# Patient Record
Sex: FEMALE | Race: BLACK OR AFRICAN AMERICAN | Employment: FULL TIME | URBAN - METROPOLITAN AREA
[De-identification: names, ages, dates, MRNs, and addresses within clinical notes are randomized per-mention and may not be internally consistent; named-entity substitution may affect disease eponyms.]

---

## 2017-03-22 ENCOUNTER — OFFICE VISIT (OUTPATIENT)
Dept: FAMILY MEDICINE CLINIC | Age: 32
End: 2017-03-22

## 2017-03-22 VITALS
OXYGEN SATURATION: 99 % | DIASTOLIC BLOOD PRESSURE: 80 MMHG | WEIGHT: 205 LBS | RESPIRATION RATE: 16 BRPM | TEMPERATURE: 98.6 F | HEART RATE: 80 BPM | HEIGHT: 64 IN | SYSTOLIC BLOOD PRESSURE: 106 MMHG | BODY MASS INDEX: 35 KG/M2

## 2017-03-22 DIAGNOSIS — R68.89 FLU-LIKE SYMPTOMS: ICD-10-CM

## 2017-03-22 DIAGNOSIS — H10.021 OTHER MUCOPURULENT CONJUNCTIVITIS OF RIGHT EYE: Primary | ICD-10-CM

## 2017-03-22 LAB
FLUAV+FLUBV AG NOSE QL IA.RAPID: NEGATIVE POS/NEG
FLUAV+FLUBV AG NOSE QL IA.RAPID: NEGATIVE POS/NEG
VALID INTERNAL CONTROL?: YES

## 2017-03-22 RX ORDER — TOBRAMYCIN 3 MG/ML
1 SOLUTION/ DROPS OPHTHALMIC EVERY 6 HOURS
Qty: 1 BOTTLE | Refills: 0 | Status: SHIPPED | OUTPATIENT
Start: 2017-03-22 | End: 2017-04-01

## 2017-03-22 NOTE — PROGRESS NOTES
Marisa Diaz is a 36 y.o. female here for eye problem      1. Have you been to the ER, urgent care clinic or hospitalized since your last visit? NO.     2. Have you seen or consulted any other health care providers outside of the [address] [place] [address] since your last visit (Include any pap smears or colon screening)? NO      Do you have an Advanced Directive? NO    Would you like information on Advanced Directives?  NO

## 2017-03-22 NOTE — PATIENT INSTRUCTIONS
Tobrex drops, 1 drop to right eye 4 x daily for 7-10 days  Apply warm wet compresses frequently. Complete prescribed course of antibiotics. See patient instructions. Follow up for new symptoms, worsening symptoms or failure to improve. Increase fluids, rest, OTC ibuprofen and or Tylenol as needed for pain and fever       Pinkeye: Care Instructions  Your Care Instructions    Pinkeye is redness and swelling of the eye surface and the conjunctiva (the lining of the eyelid and the covering of the white part of the eye). Pinkeye is also called conjunctivitis. Pinkeye is often caused by infection with bacteria or a virus. Dry air, allergies, smoke, and chemicals are other common causes. Pinkeye often clears on its own in 7 to 10 days. Antibiotics only help if the pinkeye is caused by bacteria. Pinkeye caused by infection spreads easily. If an allergy or chemical is causing pinkeye, it will not go away unless you can avoid whatever is causing it. Follow-up care is a key part of your treatment and safety. Be sure to make and go to all appointments, and call your doctor if you are having problems. Its also a good idea to know your test results and keep a list of the medicines you take. How can you care for yourself at home? · Wash your hands often. Always wash them before and after you treat pinkeye or touch your eyes or face. · Use moist cotton or a clean, wet cloth to remove crust. Wipe from the inside corner of the eye to the outside. Use a clean part of the cloth for each wipe. · Put cold or warm wet cloths on your eye a few times a day if the eye hurts. · Do not wear contact lenses or eye makeup until the pinkeye is gone. Throw away any eye makeup you were using when you got pinkeye. Clean your contacts and storage case. If you wear disposable contacts, use a new pair when your eye has cleared and it is safe to wear contacts again.   · If the doctor gave you antibiotic ointment or eyedrops, use them as directed. Use the medicine for as long as instructed, even if your eye starts looking better soon. Keep the bottle tip clean, and do not let it touch the eye area. · To put in eyedrops or ointment:  ¨ Tilt your head back, and pull your lower eyelid down with one finger. ¨ Drop or squirt the medicine inside the lower lid. ¨ Close your eye for 30 to 60 seconds to let the drops or ointment move around. ¨ Do not touch the ointment or dropper tip to your eyelashes or any other surface. · Do not share towels, pillows, or washcloths while you have pinkeye. When should you call for help? Call your doctor now or seek immediate medical care if:  · You have pain in your eye, not just irritation on the surface. · You have a change in vision or loss of vision. · You have an increase in discharge from the eye. · Your eye has not started to improve or begins to get worse within 48 hours after you start using antibiotics. · Pinkeye lasts longer than 7 days. Watch closely for changes in your health, and be sure to contact your doctor if you have any problems. Where can you learn more? Go to http://placido-willi.info/. Enter Y392 in the search box to learn more about \"Pinkeye: Care Instructions. \"  Current as of: May 27, 2016  Content Version: 11.1  © 9691-8878 [employer]. Care instructions adapted under license by [employer] (which disclaims liability or warranty for this information). If you have questions about a medical condition or this instruction, always ask your healthcare professional. [place] [address] any warranty or liability for your use of this information.

## 2017-03-22 NOTE — PROGRESS NOTES
HISTORY OF PRESENT ILLNESS  Otoniel Sosa is a 36 y.o. female. Eye Problem   The history is provided by the patient and medical records. This is a new problem. Episode onset: 1 week ago. Associated symptoms include headaches. Patient Active Problem List   Diagnosis Code    Asthma J45.909    PUD (peptic ulcer disease) K27.9    Chronic abdominal pain R10.9, G89.29    Chronic constipation K59.09    Chronic tension-type headache, not intractable G44.229       Current Outpatient Prescriptions:     albuterol (PROVENTIL HFA, VENTOLIN HFA, PROAIR HFA) 90 mcg/actuation inhaler, Take 2 Puffs by inhalation every four (4) hours as needed for Wheezing., Disp: 1 Inhaler, Rfl: 11    amitriptyline (ELAVIL) 25 mg tablet, Take 1 Tab by mouth nightly., Disp: 30 Tab, Rfl: 1    inhalational spacing device, 1 Each by Does Not Apply route as needed. , Disp: 1 Each, Rfl: 0    esomeprazole (NEXIUM) 40 mg capsule, Take 1 Cap by mouth daily. , Disp: 30 Cap, Rfl: 3    EPINEPHrine (EPIPEN) 0.3 mg/0.3 mL injection, 0.3 mg by IntraMUSCular route once as needed. , Disp: , Rfl:     mometasone-formoterol (DULERA) 200-5 mcg/actuation HFA inhaler, Take 2 Puffs by inhalation two (2) times a day., Disp: , Rfl:     albuterol (PROVENTIL VENTOLIN) 2.5 mg /3 mL (0.083 %) nebulizer solution, by Nebulization route as needed. , Disp: , Rfl:       Review of Systems   Constitutional: Positive for chills and fever (101.7). HENT: Positive for congestion. Negative for sore throat. Respiratory: Positive for cough. Negative for sputum production. Gastrointestinal: Negative for diarrhea and vomiting. Musculoskeletal: Positive for myalgias. Neurological: Positive for headaches.      Visit Vitals    /80 (BP 1 Location: Left arm, BP Patient Position: Sitting)    Pulse 80    Temp 98.6 °F (37 °C) (Oral)    Resp 16    Ht 5' 4\" (1.626 m)    Wt 205 lb (93 kg)    SpO2 99%    BMI 35.19 kg/m2       Physical Exam   Constitutional: She is oriented to person, place, and time. She appears well-developed and well-nourished. HENT:   Head: Normocephalic. Right Ear: Tympanic membrane and ear canal normal.   Left Ear: Tympanic membrane and ear canal normal.   Mouth/Throat: Oropharynx is clear and moist.   Eyes: EOM are normal.   Conjunctival erythema OD, anterior chamber clear   Neck: Neck supple. Cardiovascular: Normal rate, regular rhythm and normal heart sounds. Pulmonary/Chest: Effort normal and breath sounds normal.   Lymphadenopathy:     She has no cervical adenopathy. Neurological: She is alert and oriented to person, place, and time. Skin: Skin is warm and dry. Psychiatric: She has a normal mood and affect. Her behavior is normal.   Nursing note and vitals reviewed. Issa rapid flu negative for influenza type A or type B  ASSESSMENT and PLAN    ICD-10-CM ICD-9-CM    1. Other mucopurulent conjunctivitis of right eye H10.021     2. Flu-like symptoms R68.89 780.99 AMB POC ISSA INFLUENZA A/B TEST   Tobrex drops, 1 drop to right eye 4 x daily for 7-10 days  Apply warm wet compresses frequently. Complete prescribed course of antibiotics. See patient instructions. Follow up for new symptoms, worsening symptoms or failure to improve.   Increase fluids, rest, OTC ibuprofen and or Tylenol as needed for pain and fever

## 2017-03-22 NOTE — LETTER
NOTIFICATION RETURN TO WORK / SCHOOL 
 
3/22/2017 2:10 PM 
 
Ms. Elvi Wilkes [address] [address] To Whom It May Concern: 
 
Tomy Sosa is currently under the care of [address] [place] [address]. She will return to work/school on: 3/27/2017 If there are questions or concerns please have the patient contact our office. Sincerely, Pedro Rush MD

## 2017-03-22 NOTE — MR AVS SNAPSHOT
Visit Information Date & Time Provider Department Dept. Phone Encounter #  
 3/22/2017  1:45 PM Rubén Childress, Erik [facility] [phone] [id number] Upcoming Health Maintenance Date Due Pneumococcal 19-64 Medium Risk (1 of 1 - PPSV23) 8/15/2006 DTaP/Tdap/Td series (1 - Tdap) 8/15/2008 PAP AKA CERVICAL CYTOLOGY 8/15/2008 INFLUENZA AGE 9 TO ADULT 8/1/2016 Allergies as of 3/22/2017  Review Complete On: 3/22/2017 By: Rubén Childress MD  
  
 Severity Noted Reaction Type Reactions Percocet [Oxycodone-acetaminophen]  11/16/2015    Nausea and Vomiting Tramadol  03/15/2016    Other (comments) Abdominal pain Current Immunizations  Never Reviewed No immunizations on file. Not reviewed this visit You Were Diagnosed With   
  
 Codes Comments Other mucopurulent conjunctivitis of right eye    -  Primary ICD-10-CM: H10.021 Flu-like symptoms     ICD-10-CM: R68.89 ICD-9-CM: 780.99 Vitals BP Pulse Temp Resp Height(growth percentile) Weight(growth percentile) 106/80 (BP 1 Location: Left arm, BP Patient Position: Sitting) 80 98.6 °F (37 °C) (Oral) 16 5' 4\" (1.626 m) 205 lb (93 kg) SpO2 BMI OB Status Smoking Status 99% 35.19 kg/m2 Having regular periods Never Smoker BMI and BSA Data Body Mass Index Body Surface Area  
 35.19 kg/m 2 2.05 m 2 Preferred Pharmacy Pharmacy Name Phone [address] [facility], [address] [facility] AT [address] [place] [address] [address] [phone] Your Updated Medication List  
  
   
This list is accurate as of: 3/22/17  2:10 PM.  Always use your most recent med list.  
  
  
  
  
 * albuterol 2.5 mg /3 mL (0.083 %) nebulizer solution Commonly known as:  PROVENTIL VENTOLIN  
by Nebulization route as needed. * albuterol 90 mcg/actuation inhaler Commonly known as:  PROVENTIL HFA, VENTOLIN HFA, PROAIR HFA  
 Take 2 Puffs by inhalation every four (4) hours as needed for Wheezing. amitriptyline 25 mg tablet Commonly known as:  ELAVIL Take 1 Tab by mouth nightly. EPINEPHrine 0.3 mg/0.3 mL injection Commonly known as:  EPIPEN  
0.3 mg by IntraMUSCular route once as needed. esomeprazole 40 mg capsule Commonly known as:  NexIUM Take 1 Cap by mouth daily. inhalational spacing device 1 Each by Does Not Apply route as needed. mometasone-formoterol 200-5 mcg/actuation HFA inhaler Commonly known as:  Tullos Lobstein Take 2 Puffs by inhalation two (2) times a day. tobramycin 0.3 % ophthalmic solution Commonly known as:  Yemi Reddy Administer 1 Drop to both eyes every six (6) hours for 10 days. * Notice: This list has 2 medication(s) that are the same as other medications prescribed for you. Read the directions carefully, and ask your doctor or other care provider to review them with you. Prescriptions Sent to Pharmacy Refills  
 tobramycin (TOBREX) 0.3 % ophthalmic solution 0 Sig: Administer 1 Drop to both eyes every six (6) hours for 10 days. Class: Normal  
 Pharmacy: [facility] [address] [place] [address], [address] [place] [address] DR AT [address] [facility] [address] Ph #: [phone] Route: Both Eyes We Performed the Following AMB POC ISSA INFLUENZA A/B TEST [22509 CPT(R)] Patient Instructions Tobrex drops, 1 drop to right eye 4 x daily for 7-10 days Apply warm wet compresses frequently. Complete prescribed course of antibiotics. See patient instructions. Follow up for new symptoms, worsening symptoms or failure to improve. Increase fluids, rest, OTC ibuprofen and or Tylenol as needed for pain and fever Pinkeye: Care Instructions Your Care Instructions Pinkeye is redness and swelling of the eye surface and the conjunctiva (the lining of the eyelid and the covering of the white part of the eye). Pinkeye is also called conjunctivitis. Pinkeye is often caused by infection with bacteria or a virus. Dry air, allergies, smoke, and chemicals are other common causes. Pinkeye often clears on its own in 7 to 10 days. Antibiotics only help if the pinkeye is caused by bacteria. Pinkeye caused by infection spreads easily. If an allergy or chemical is causing pinkeye, it will not go away unless you can avoid whatever is causing it. Follow-up care is a key part of your treatment and safety. Be sure to make and go to all appointments, and call your doctor if you are having problems. Its also a good idea to know your test results and keep a list of the medicines you take. How can you care for yourself at home? · Wash your hands often. Always wash them before and after you treat pinkeye or touch your eyes or face. · Use moist cotton or a clean, wet cloth to remove crust. Wipe from the inside corner of the eye to the outside. Use a clean part of the cloth for each wipe. · Put cold or warm wet cloths on your eye a few times a day if the eye hurts. · Do not wear contact lenses or eye makeup until the pinkeye is gone. Throw away any eye makeup you were using when you got pinkeye. Clean your contacts and storage case. If you wear disposable contacts, use a new pair when your eye has cleared and it is safe to wear contacts again. · If the doctor gave you antibiotic ointment or eyedrops, use them as directed. Use the medicine for as long as instructed, even if your eye starts looking better soon. Keep the bottle tip clean, and do not let it touch the eye area. · To put in eyedrops or ointment: ¨ Tilt your head back, and pull your lower eyelid down with one finger. ¨ Drop or squirt the medicine inside the lower lid. ¨ Close your eye for 30 to 60 seconds to let the drops or ointment move around. ¨ Do not touch the ointment or dropper tip to your eyelashes or any other surface. · Do not share towels, pillows, or washcloths while you have pinkeye. When should you call for help? Call your doctor now or seek immediate medical care if: 
· You have pain in your eye, not just irritation on the surface. · You have a change in vision or loss of vision. · You have an increase in discharge from the eye. · Your eye has not started to improve or begins to get worse within 48 hours after you start using antibiotics. · Pinkeye lasts longer than 7 days. Watch closely for changes in your health, and be sure to contact your doctor if you have any problems. Where can you learn more? Go to http://placido-willi.info/. Enter Y392 in the search box to learn more about \"Pinkeye: Care Instructions. \" Current as of: May 27, 2016 Content Version: 11.1 © 3566-9360 [employer]. Care instructions adapted under license by [employer] (which disclaims liability or warranty for this information). If you have questions about a medical condition or this instruction, always ask your healthcare professional. [address] any warranty or liability for your use of this information. Introducing hospitals & HEALTH SERVICES! Seng Juarez introduces CamPlex patient portal. Now you can access parts of your medical record, email your doctor's office, and request medication refills online. 1. In your internet browser, go to [email or web address] 2. Click on the First Time User? Click Here link in the Sign In box. You will see the New Member Sign Up page. 3. Enter your CamPlex Access Code exactly as it appears below. You will not need to use this code after youve completed the sign-up process. If you do not sign up before the expiration date, you must request a new code. · CamPlex Access Code: [email or web address] Expires: 6/20/2017  2:10 PM 
 
4.  Enter the last four digits of your Social Security Number (xxxx) and Date of Birth (mm/dd/yyyy) as indicated and click Submit. You will be taken to the next sign-up page. 5. Create a Stipple ID. This will be your Stipple login ID and cannot be changed, so think of one that is secure and easy to remember. 6. Create a Stipple password. You can change your password at any time. 7. Enter your Password Reset Question and Answer. This can be used at a later time if you forget your password. 8. Enter your e-mail address. You will receive e-mail notification when new information is available in [address] 9. Click Sign Up. You can now view and download portions of your medical record. 10. Click the Download Summary menu link to download a portable copy of your medical information. If you have questions, please visit the Frequently Asked Questions section of the Stipple website. Remember, Stipple is NOT to be used for urgent needs. For medical emergencies, dial 911. Now available from your iPhone and Android! Please provide this summary of care documentation to your next provider. Your primary care clinician is listed as Rob[address] 13. If you have any questions after today's visit, please call [phone].

## 2017-07-11 ENCOUNTER — OFFICE VISIT (OUTPATIENT)
Dept: FAMILY MEDICINE CLINIC | Age: 32
End: 2017-07-11

## 2017-07-11 VITALS
WEIGHT: 207 LBS | BODY MASS INDEX: 35.34 KG/M2 | TEMPERATURE: 98.1 F | HEIGHT: 64 IN | RESPIRATION RATE: 18 BRPM | DIASTOLIC BLOOD PRESSURE: 82 MMHG | HEART RATE: 79 BPM | OXYGEN SATURATION: 99 % | SYSTOLIC BLOOD PRESSURE: 106 MMHG

## 2017-07-11 DIAGNOSIS — Z11.1 SCREENING-PULMONARY TB: ICD-10-CM

## 2017-07-11 DIAGNOSIS — Z23 ENCOUNTER FOR IMMUNIZATION: ICD-10-CM

## 2017-07-11 DIAGNOSIS — Z00.00 ROUTINE GENERAL MEDICAL EXAMINATION AT A HEALTH CARE FACILITY: Primary | ICD-10-CM

## 2017-07-11 DIAGNOSIS — N75.0 BARTHOLIN GLAND CYST: ICD-10-CM

## 2017-07-11 RX ORDER — EPINEPHRINE 0.3 MG/.3ML
0.3 INJECTION SUBCUTANEOUS
Qty: 1 SYRINGE | Refills: 0 | Status: SHIPPED | OUTPATIENT
Start: 2017-07-11 | End: 2021-03-18

## 2017-07-11 RX ORDER — ALBUTEROL SULFATE 90 UG/1
2 AEROSOL, METERED RESPIRATORY (INHALATION)
Qty: 1 INHALER | Refills: 11 | Status: SHIPPED | OUTPATIENT
Start: 2017-07-11 | End: 2018-11-12 | Stop reason: SDUPTHER

## 2017-07-11 NOTE — PROGRESS NOTES
Assessment/Plan:    1. Routine general medical examination at a health care facility    2. Encounter for immunization  - Tetanus, diphtheria toxoids and acellular pertussis (TDAP) vaccine, in individuals >=7 years, IM  - VT IMMUNIZ ADMIN,1 SINGLE/COMB VAC/TOXOID    3. Screening-pulmonary TB  - AMB POC TUBERCULOSIS, INTRADERMAL (SKIN TEST)    4. Bartholin gland cyst  -pt to make appt with gyn as this is a chronic ongoing issue    The plan was discussed with the patient. The patient verbalized understanding and is in agreement with the plan. All medication potential side effects were discussed with the patient. Health Maintenance:   Health Maintenance   Topic Date Due    Pneumococcal 19-64 Medium Risk (1 of 1 - PPSV23) 08/15/2006    DTaP/Tdap/Td series (1 - Tdap) 08/15/2008    PAP AKA CERVICAL CYTOLOGY  08/15/2008    INFLUENZA AGE 9 TO ADULT  08/01/2017     Matty Sosa is a 36 y.o. female and presents with Physical     Subjective:  Here for physical.  No complaints. Pt states she has a bartholin gland cyst on L. It's very irritated x 2weeks. Has previously had it drained 3 years ago. Has been getting bigger. ROS:  Constitutional: No recent weight change. No weakness/fatigue. No f/c. Skin: No rashes, change in nails/hair, itching   HENT: No HA, dizziness. No hearing loss/tinnitus. No nasal congestion/discharge. Eyes: No change in vision, double/blurred vision or eye pain/redness. Cardiovascular: No CP/palpitations. No CHRISTIANSEN/orthopnea/PND. Respiratory: No cough/sputum, dyspnea, wheezing. Gastointestinal: No dysphagia, reflux. No n/v. No constipation/diarrhea. No melena/rectal bleeding. Genitourinary: No dysuria, urinary hesitancy, nocturia, hematuria. No incontinence. Musculoskeletal: No joint pain/stiffness. No muscle pain/tenderness. Endo: No heat/cold intolerance, no polyuria/polydypsia. Heme: No h/o anemia. No easy bleeding/bruising.    Allergy/Immunology: No seasonal rhinitis. Denies frequent colds, sinus/ear infections. Neurological: No seizures/numbness/weakness. No paresthesias. Psychiatric:  No depression, anxiety. The problem list was updated as a part of today's visit. Patient Active Problem List   Diagnosis Code    Asthma J45.909    PUD (peptic ulcer disease) K27.9    Chronic abdominal pain R10.9, G89.29    Chronic constipation K59.09    Chronic tension-type headache, not intractable G44.229       The PSH, FH were reviewed. SH:  Social History   Substance Use Topics    Smoking status: Never Smoker    Smokeless tobacco: Never Used    Alcohol use Yes       Medications/Allergies:  Current Outpatient Prescriptions on File Prior to Visit   Medication Sig Dispense Refill    albuterol (PROVENTIL HFA, VENTOLIN HFA, PROAIR HFA) 90 mcg/actuation inhaler Take 2 Puffs by inhalation every four (4) hours as needed for Wheezing. 1 Inhaler 11    amitriptyline (ELAVIL) 25 mg tablet Take 1 Tab by mouth nightly. 30 Tab 1    inhalational spacing device 1 Each by Does Not Apply route as needed. 1 Each 0    esomeprazole (NEXIUM) 40 mg capsule Take 1 Cap by mouth daily. 30 Cap 3    EPINEPHrine (EPIPEN) 0.3 mg/0.3 mL injection 0.3 mg by IntraMUSCular route once as needed.  mometasone-formoterol (DULERA) 200-5 mcg/actuation HFA inhaler Take 2 Puffs by inhalation two (2) times a day.  albuterol (PROVENTIL VENTOLIN) 2.5 mg /3 mL (0.083 %) nebulizer solution by Nebulization route as needed. No current facility-administered medications on file prior to visit.          Allergies   Allergen Reactions    Percocet [Oxycodone-Acetaminophen] Nausea and Vomiting    Tramadol Other (comments)     Abdominal pain       Objective:  Visit Vitals    /82 (BP 1 Location: Left arm, BP Patient Position: Sitting)    Pulse 79    Temp 98.1 °F (36.7 °C) (Oral)    Resp 18    Ht 5' 4\" (1.626 m)    Wt 207 lb (93.9 kg)    LMP 06/23/2017    SpO2 99%    BMI 35.53 kg/m2      Constitutional: Well developed, nourished, no distress, alert, obese habitus   HENT: Exterior ears and tympanic membranes normal bilaterally. Supple neck. No thyromegaly or lymphadenopathy. Oropharynx clear and moist mucous membranes. Eyes: Conjunctiva normal. PERRL. CV: S1, S2.  RRR. No murmurs/rubs. No thrills palpated. No carotid bruits. Intact distal pulses. No edema. Pulm: No abnormalities on inspection. Clear to auscultation bilaterally. No wheezing/rhonchi. Normal effort. GI: Soft, nontender, nondistended. Normal active bowel sounds. MS: Gait normal.  Joints without deformity/tenderness. Strength intact bilateral upper and lower ext. Normal ROM all extremities. Neuro: A/O x 3. No focal motor or sensory deficits. Speech normal.   Skin: No lesions/rashes on inspection. Psych: Appropriate affect, judgement and insight. Short-term memory intact. Labwork and Ancillary Studies:    CBC w/Diff  Lab Results   Component Value Date/Time    WBC 4.3 06/22/2016 09:04 AM    HGB 12.6 06/22/2016 09:04 AM    PLATELET 284 73/50/1039 09:04 AM         Basic Metabolic Profile/LFTs  Lab Results   Component Value Date/Time    Sodium 138 10/04/2016 10:50 AM    Potassium 4.5 10/04/2016 10:50 AM    Chloride 99 10/04/2016 10:50 AM    CO2 24 10/04/2016 10:50 AM    Anion gap 7 10/01/2014 09:25 PM    Glucose 101 10/04/2016 10:50 AM    BUN 9 10/04/2016 10:50 AM    Creatinine 0.77 10/04/2016 10:50 AM    BUN/Creatinine ratio 12 10/04/2016 10:50 AM    GFR est  10/04/2016 10:50 AM    GFR est non- 10/04/2016 10:50 AM    Calcium 9.6 10/04/2016 10:50 AM      Lab Results   Component Value Date/Time    ALT (SGPT) 14 10/04/2016 10:50 AM    AST (SGOT) 13 10/04/2016 10:50 AM    Alk.  phosphatase 44 10/04/2016 10:50 AM    Bilirubin, total 0.3 10/04/2016 10:50 AM

## 2017-07-11 NOTE — MR AVS SNAPSHOT
Visit Information Date & Time Provider Department Dept. Phone Encounter #  
 7/11/2017  2:00 PM Deni Glynn, 3 [facility] [id number] Upcoming Health Maintenance Date Due Pneumococcal 19-64 Medium Risk (1 of 1 - PPSV23) 8/15/2006 DTaP/Tdap/Td series (1 - Tdap) 8/15/2008 PAP AKA CERVICAL CYTOLOGY 8/15/2008 INFLUENZA AGE 9 TO ADULT 8/1/2017 Allergies as of 7/11/2017  Review Complete On: 7/11/2017 By: Deni Glynn MD  
  
 Severity Noted Reaction Type Reactions Percocet [Oxycodone-acetaminophen]  11/16/2015    Nausea and Vomiting Tramadol  03/15/2016    Other (comments) Abdominal pain Current Immunizations  Never Reviewed Name Date  
 TB Skin Test (PPD) Intradermal 7/11/2017  2:10 PM  
 Tdap 7/11/2017  2:27 PM  
  
 Not reviewed this visit You Were Diagnosed With   
  
 Codes Comments Routine general medical examination at a health care facility    -  Primary ICD-10-CM: Z00.00 ICD-9-CM: V70.0 Encounter for immunization     ICD-10-CM: X35 ICD-9-CM: V03.89 Screening-pulmonary TB     ICD-10-CM: Z11.1 ICD-9-CM: V74.1 Bartholin gland cyst     ICD-10-CM: N75.0 ICD-9-CM: 616.2 Vitals BP Pulse Temp Resp Height(growth percentile) Weight(growth percentile) 106/82 (BP 1 Location: Left arm, BP Patient Position: Sitting) 79 98.1 °F (36.7 °C) (Oral) 18 5' 4\" (1.626 m) 207 lb (93.9 kg) LMP SpO2 BMI OB Status Smoking Status 06/23/2017 99% 35.53 kg/m2 Having regular periods Never Smoker Vitals History BMI and BSA Data Body Mass Index Body Surface Area 35.53 kg/m 2 2.06 m 2 Preferred Pharmacy Pharmacy Name Phone [address], [address] [place] AT [address] [facility] [address] [phone] Your Updated Medication List  
  
   
This list is accurate as of: 7/11/17  3:04 PM.  Always use your most recent med list.  
  
  
  
  
 * albuterol 2.5 mg /3 mL (0.083 %) nebulizer solution Commonly known as:  PROVENTIL VENTOLIN  
by Nebulization route as needed. * albuterol 90 mcg/actuation inhaler Commonly known as:  PROVENTIL HFA, VENTOLIN HFA, PROAIR HFA Take 2 Puffs by inhalation every four (4) hours as needed for Wheezing. amitriptyline 25 mg tablet Commonly known as:  ELAVIL Take 1 Tab by mouth nightly. EPINEPHrine 0.3 mg/0.3 mL injection Commonly known as:  EPIPEN  
0.3 mL by IntraMUSCular route once as needed. esomeprazole 40 mg capsule Commonly known as:  NexIUM Take 1 Cap by mouth daily. inhalational spacing device 1 Each by Does Not Apply route as needed. mometasone-formoterol 200-5 mcg/actuation HFA inhaler Commonly known as:  Raymona Mends Take 2 Puffs by inhalation two (2) times a day. * Notice: This list has 2 medication(s) that are the same as other medications prescribed for you. Read the directions carefully, and ask your doctor or other care provider to review them with you. Prescriptions Sent to Pharmacy Refills  
 albuterol (PROVENTIL HFA, VENTOLIN HFA, PROAIR HFA) 90 mcg/actuation inhaler 11 Sig: Take 2 Puffs by inhalation every four (4) hours as needed for Wheezing. Class: Normal  
 Pharmacy: [facility] [address] [place] [address], [address] [place] [address] DR AT [address] Ph #: [phone] Route: Inhalation EPINEPHrine (EPIPEN) 0.3 mg/0.3 mL injection 0 Si.3 mL by IntraMUSCular route once as needed. Class: Normal  
 Pharmacy: [facility] [address] [place] [address], [address] [place] [address] DR AT [address] Ph #: [phone] Route: IntraMUSCular We Performed the Following AMB POC TUBERCULOSIS, INTRADERMAL (SKIN TEST) [42830 CPT(R)] FL IMMUNIZ ADMIN,1 SINGLE/COMB VAC/TOXOID [id number] CPT(R)]  TETANUS, DIPHTHERIA TOXOIDS AND ACELLULAR PERTUSSIS VACCINE (TDAP), IN INDIVIDS. >=7,  C600050 CPT(R)] Patient Instructions You have been referred to gynecology. Please call one of the preferred providers listed below and schedule your appointment. Once you have scheduled your appointment, please call the office at [phone] and leave the details of your appointment (provider you will be seeing, appointment date and time) on the voice mail. [facility] DARY Londono [address], 9[address] [facility], 615 [facility],  [address] 
phone: [phone] [facility] [address], 4668 [facility], 70 [facility] 
[phone] Bartholin Gland Cyst: Care Instructions Your Care Instructions The Bartholin glands are in a woman's vulva. This is the area around the vagina. The glands are normally about the size of a pea. They provide fluid to the vulvar area through a small opening. If the opening is blocked, the gland swells with fluid and forms a cyst. You can have a cyst for years with no symptoms. But if a cyst gets infected by bacteria, it can grow and become red and painful. This is called an abscess. Opening and draining the cyst usually cures the infection. You may have had a small tube (catheter) placed into the cyst or had minor surgery to let the cyst drain. The tube will usually be left in for at least 4 weeks. Your doctor may do a lab test to find out what kind of bacteria caused the infection. You may get antibiotics to kill the bacteria. You may have some drainage from the cyst for a few weeks. The gland should return to normal after the infection clears up. Follow-up care is a key part of your treatment and safety. Be sure to make and go to all appointments, and call your doctor if you are having problems. It's also a good idea to know your test results and keep a list of the medicines you take. How can you care for yourself at home? · If your doctor prescribed antibiotics, take them as directed.  Do not stop taking them just because you feel better. You need to take the full course of antibiotics. · Sit in a few inches of warm water (sitz bath) 3 times a day and after bowel movements. The warm water helps the area heal and eases discomfort. · Take an over-the-counter pain medicine such as acetaminophen (Tylenol), ibuprofen (Advil, Motrin), or naproxen (Aleve). Read and follow all instructions on the label. · Do not take two or more pain medicines at the same time unless the doctor told you to. Many pain medicines have acetaminophen, which is Tylenol. Too much acetaminophen (Tylenol) can be harmful. · Wear panty liners or pads if you have discharge from the draining cyst. 
· If you are sexually active, avoid sex until: 
¨ You have finished the antibiotics. ¨ The area has healed. · If you had a catheter placed in the cyst to help it drain, follow your doctor's instructions for activities until the tube comes out. When should you call for help? Call your doctor now or seek immediate medical care if: 
· Your pain gets worse. · You have a new or higher fever. · Your swelling increases. · The red area around the cyst gets bigger. Watch closely for changes in your health, and be sure to contact your doctor if: · The catheter falls out. · Your symptoms do not improve in 2 days. Where can you learn more? Go to http://placido-willi.info/. Enter H276 in the search box to learn more about \"Bartholin Gland Cyst: Care Instructions. \" Current as of: October 13, 2016 Content Version: 11.3 © 2411-7426 [employer]. Care instructions adapted under license by [employer] (which disclaims liability or warranty for this information). If you have questions about a medical condition or this instruction, always ask your healthcare professional. [place] [address] any warranty or liability for your use of this information. Introducing [facility]! [facility] introduces Lukkin patient portal. Now you can access parts of your medical record, email your doctor's office, and request medication refills online. 1. In your internet browser, go to [email or web address] 2. Click on the First Time User? Click Here link in the Sign In box. You will see the New Member Sign Up page. 3. Enter your Lukkin Access Code exactly as it appears below. You will not need to use this code after youve completed the sign-up process. If you do not sign up before the expiration date, you must request a new code. · Lukkin Access Code: [email or web address] Expires: 10/9/2017  2:19 PM 
 
4. Enter the last four digits of your Social Security Number (xxxx) and Date of Birth (mm/dd/yyyy) as indicated and click Submit. You will be taken to the next sign-up page. 5. Create a Lukkin ID. This will be your Lukkin login ID and cannot be changed, so think of one that is secure and easy to remember. 6. Create a Lukkin password. You can change your password at any time. 7. Enter your Password Reset Question and Answer. This can be used at a later time if you forget your password. 8. Enter your e-mail address. You will receive e-mail notification when new information is available in [address] 9. Click Sign Up. You can now view and download portions of your medical record. 10. Click the Download Summary menu link to download a portable copy of your medical information. If you have questions, please visit the Frequently Asked Questions section of the Lukkin website. Remember, Lukkin is NOT to be used for urgent needs. For medical emergencies, dial 911. Now available from your iPhone and Android! Please provide this summary of care documentation to your next provider. Your primary care clinician is listed as Robe 13. If you have any questions after today's visit, please call [phone].

## 2017-07-11 NOTE — PROGRESS NOTES
Daja Freeman is a 36 y.o. female here today for physical     1. Have you been to the ER, urgent care clinic or hospitalized since your last visit? NO.     2. Have you seen or consulted any other health care providers outside of the [address] [place] [address] since your last visit (Include any pap smears or colon screening)? NO      Do you have an Advanced Directive? NO    Would you like information on Advanced Directives? NO    Learning Assessment 5/2/2016   PRIMARY LEARNER Patient   HIGHEST LEVEL OF EDUCATION - PRIMARY LEARNER  2 YEARS OF COLLEGE   BARRIERS PRIMARY LEARNER NONE   CO-LEARNER CAREGIVER No   PRIMARY LANGUAGE ENGLISH    NEED No   LEARNER PREFERENCE PRIMARY READING   LEARNING SPECIAL TOPICS none   ANSWERED BY patient   RELATIONSHIP SELF   ASSESSMENT COMMENT n/a       PPD Placement note  Matty Sosa, 36 y.o. female is here today for placement of PPD test left forearm   Reason for PPD test: screening for work   Pt taken PPD test before: yes  Verified in allergy area and with patient that they are not allergic to the products PPD is made of (Phenol or Tween). No:   Is patient taking any oral or IV steroid medication now or have they taken it in the last month? no  Has the patient ever received the BCG vaccine?: no  Has the patient been in recent contact with anyone known or suspected of having active TB disease?: no       Date of exposure (if applicable): n/a       Name of person they were exposed to (if applicable): n/a   Patient's Country of origin?: [place]   O: Alert and oriented in NAD. P:  PPD placed on 7/11/2017. Patient advised to return for reading within 48-72 hours. Tdap Immunization/s administered 7/11/2017 by Rip Ruffin LPN with consent. Patient tolerated procedure well. No reactions noted.

## 2017-07-11 NOTE — PATIENT INSTRUCTIONS
You have been referred to gynecology. Please call one of the preferred providers listed below and schedule your appointment. Once you have scheduled your appointment, please call the office at [phone] and leave the details of your appointment (provider you will be seeing, appointment date and time) on the voice mail. Iman Harvey Patient  [address], 996 [facility], [address],   [address]  phone: [phone] [facility]  [address], 7831 [facility], 70 [facility]  [phone]           Bartholin Gland Cyst: Care Instructions  Your Care Instructions    The Bartholin glands are in a woman's vulva. This is the area around the vagina. The glands are normally about the size of a pea. They provide fluid to the vulvar area through a small opening. If the opening is blocked, the gland swells with fluid and forms a cyst. You can have a cyst for years with no symptoms. But if a cyst gets infected by bacteria, it can grow and become red and painful. This is called an abscess. Opening and draining the cyst usually cures the infection. You may have had a small tube (catheter) placed into the cyst or had minor surgery to let the cyst drain. The tube will usually be left in for at least 4 weeks. Your doctor may do a lab test to find out what kind of bacteria caused the infection. You may get antibiotics to kill the bacteria. You may have some drainage from the cyst for a few weeks. The gland should return to normal after the infection clears up. Follow-up care is a key part of your treatment and safety. Be sure to make and go to all appointments, and call your doctor if you are having problems. It's also a good idea to know your test results and keep a list of the medicines you take. How can you care for yourself at home? · If your doctor prescribed antibiotics, take them as directed. Do not stop taking them just because you feel better.  You need to take the full course of antibiotics. · Sit in a few inches of warm water (sitz bath) 3 times a day and after bowel movements. The warm water helps the area heal and eases discomfort. · Take an over-the-counter pain medicine such as acetaminophen (Tylenol), ibuprofen (Advil, Motrin), or naproxen (Aleve). Read and follow all instructions on the label. · Do not take two or more pain medicines at the same time unless the doctor told you to. Many pain medicines have acetaminophen, which is Tylenol. Too much acetaminophen (Tylenol) can be harmful. · Wear panty liners or pads if you have discharge from the draining cyst.  · If you are sexually active, avoid sex until:  ¨ You have finished the antibiotics. ¨ The area has healed. · If you had a catheter placed in the cyst to help it drain, follow your doctor's instructions for activities until the tube comes out. When should you call for help? Call your doctor now or seek immediate medical care if:  · Your pain gets worse. · You have a new or higher fever. · Your swelling increases. · The red area around the cyst gets bigger. Watch closely for changes in your health, and be sure to contact your doctor if:  · The catheter falls out. · Your symptoms do not improve in 2 days. Where can you learn more? Go to http://placido-willi.info/. Enter H276 in the search box to learn more about \"Bartholin Gland Cyst: Care Instructions. \"  Current as of: October 13, 2016  Content Version: 11.3  © 1326-1123 [employer]. Care instructions adapted under license by [employer] (which disclaims liability or warranty for this information). If you have questions about a medical condition or this instruction, always ask your healthcare professional. [place] [address] any warranty or liability for your use of this information.

## 2017-07-14 ENCOUNTER — CLINICAL SUPPORT (OUTPATIENT)
Dept: FAMILY MEDICINE CLINIC | Age: 32
End: 2017-07-14

## 2017-07-14 DIAGNOSIS — Z11.1 ENCOUNTER FOR PPD SKIN TEST READING: Primary | ICD-10-CM

## 2017-07-14 LAB
MM INDURATION POC: 0 MM (ref 0–5)
PPD POC: NORMAL NEGATIVE

## 2017-07-14 NOTE — PROGRESS NOTES
PPD Reading Note  PPD read and results entered in [facility]. Result: 0 mm induration.   Interpretation: negative   If test not read within 48-72 hours of initial placement,   Allergic reaction: no

## 2017-07-24 ENCOUNTER — HOSPITAL ENCOUNTER (OUTPATIENT)
Dept: LAB | Age: 32
Discharge: HOME | End: 2017-07-24

## 2017-07-24 ENCOUNTER — OFFICE VISIT (OUTPATIENT)
Dept: OBGYN CLINIC | Age: 32
End: 2017-07-24

## 2017-07-24 VITALS
HEIGHT: 64 IN | SYSTOLIC BLOOD PRESSURE: 130 MMHG | WEIGHT: 211 LBS | BODY MASS INDEX: 36.02 KG/M2 | HEART RATE: 76 BPM | OXYGEN SATURATION: 98 % | RESPIRATION RATE: 18 BRPM | DIASTOLIC BLOOD PRESSURE: 93 MMHG | TEMPERATURE: 97.6 F

## 2017-07-24 DIAGNOSIS — Z11.3 SCREENING FOR STD (SEXUALLY TRANSMITTED DISEASE): ICD-10-CM

## 2017-07-24 DIAGNOSIS — N75.0 BARTHOLIN'S GLAND CYST: ICD-10-CM

## 2017-07-24 DIAGNOSIS — Z01.411 ENCOUNTER FOR GYNECOLOGICAL EXAMINATION WITH ABNORMAL FINDING: Primary | ICD-10-CM

## 2017-07-24 PROCEDURE — 99001 SPECIMEN HANDLING PT-LAB: CPT | Performed by: OBSTETRICS & GYNECOLOGY

## 2017-07-24 NOTE — PROGRESS NOTES
Subjective:   36 y.o. female for Well Woman Check. Patient's last menstrual period was 07/19/2017. Social History: has sex with females. Pertinent past medical hstory: no history of HTN, DVT, CAD, DM, liver disease, migraines or smoking. Current Outpatient Prescriptions   Medication Sig Dispense Refill    albuterol (PROVENTIL HFA, VENTOLIN HFA, PROAIR HFA) 90 mcg/actuation inhaler Take 2 Puffs by inhalation every four (4) hours as needed for Wheezing. 1 Inhaler 11    EPINEPHrine (EPIPEN) 0.3 mg/0.3 mL injection 0.3 mL by IntraMUSCular route once as needed. 1 Syringe 0    amitriptyline (ELAVIL) 25 mg tablet Take 1 Tab by mouth nightly. 30 Tab 1    inhalational spacing device 1 Each by Does Not Apply route as needed. 1 Each 0    esomeprazole (NEXIUM) 40 mg capsule Take 1 Cap by mouth daily. 30 Cap 3    mometasone-formoterol (DULERA) 200-5 mcg/actuation HFA inhaler Take 2 Puffs by inhalation two (2) times a day.  albuterol (PROVENTIL VENTOLIN) 2.5 mg /3 mL (0.083 %) nebulizer solution by Nebulization route as needed. Allergies   Allergen Reactions    Percocet [Oxycodone-Acetaminophen] Nausea and Vomiting    Tramadol Other (comments)     Abdominal pain     Past Medical History:   Diagnosis Date    Asthma     Gastrointestinal disorder     ulcers    Ovarian cyst     Ulcer ([facility] [address]      Past Surgical History:   Procedure Laterality Date    HX ORTHOPAEDIC      bilateral knee surgery     Family History   Problem Relation Age of Onset    Diabetes Maternal Grandmother     Cancer Neg Hx     Heart Disease Neg Hx     Hypertension Neg Hx     Stroke Neg Hx      Social History   Substance Use Topics    Smoking status: Never Smoker    Smokeless tobacco: Never Used    Alcohol use Yes        ROS:  Feeling well. No dyspnea or chest pain on exertion. No abdominal pain, change in bowel habits, black or bloody stools. No urinary tract symptoms.  GYN ROS: normal menses, no abnormal bleeding, pelvic pain or discharge, no breast pain or new or enlarging lumps on self exam, she complains of left vulvar swelling which is currently non-tender and she says has decreased in size. She relates a history of multiple episodes of a Bartholin's cyst. No neurological complaints. Objective:     Visit Vitals    BP (!) 130/93    Pulse 76    Temp 97.6 °F (36.4 °C)    Resp 18    Ht 5' 4\" (1.626 m)    Wt 211 lb (95.7 kg)    LMP 07/19/2017    SpO2 98%    BMI 36.22 kg/m2     The patient appears well, alert, oriented x 3, in no distress. ENT normal.  Neck supple. No adenopathy or thyromegaly. THOMAS. Lungs are clear, good air entry, no wheezes, rhonchi or rales. S1 and S2 normal, no murmurs, regular rate and rhythm. Abdomen soft without tenderness, guarding, mass or organomegaly. Extremities show no edema, normal peripheral pulses. Neurological is normal, no focal findings. BREAST EXAM: breasts appear normal, no suspicious masses, no skin or nipple changes or axillary nodes    PELVIC EXAM: VULVA: mild enlargement of the left Bartholin's gland with 3 x 3 cm area of fluctuance, non-tender, no erythema or drainage, VAGINA: normal appearing vagina with normal color and discharge, no lesions, CERVIX: normal appearing cervix without discharge or lesions, UTERUS: uterus is normal size, shape, consistency and nontender, PAP: Pap smear done today, DNA probe for chlamydia and GC obtained    Assessment/Plan:   well woman  Bartholin's gland cyst, resolving. No indication for treatment at this time. Patient instructed to follow up ASAP with the next episode of swelling to determine if she is a candidate for marsupialization. STD screening  pap smear  return annually or prn  Plan of care discussed. Patient expressed understanding.

## 2017-07-27 LAB
HBV SURFACE AG SERPL QL IA: NORMAL
HCV AB S/CO SERPL IA: <0.1 S/CO RATIO (ref 0–0.9)
HIV 1+2 AB+HIV1 P24 AG SERPL QL IA: NON REACTIVE
PLEASE NOTE:, 188601: NORMAL
T PALLIDUM AB SER QL IA: NEGATIVE

## 2017-07-28 LAB
C TRACH RRNA CVX QL NAA+PROBE: NEGATIVE
CYTOLOGIST CVX/VAG CYTO: NORMAL
CYTOLOGY CVX/VAG DOC THIN PREP: NORMAL
DX ICD CODE: NORMAL
LABCORP, 190119: NORMAL
Lab: NORMAL
N GONORRHOEA RRNA CVX QL NAA+PROBE: NEGATIVE
OTHER STN SPEC: NORMAL
PATH REPORT.FINAL DX SPEC: NORMAL
STAT OF ADQ CVX/VAG CYTO-IMP: NORMAL
T VAGINALIS RRNA SPEC QL NAA+PROBE: NEGATIVE

## 2017-09-26 ENCOUNTER — TELEPHONE (OUTPATIENT)
Dept: FAMILY MEDICINE CLINIC | Age: 32
End: 2017-09-26

## 2017-09-26 NOTE — TELEPHONE ENCOUNTER
Pt called stating she is having sharp pain and tingling in her left hand from neck down to fingertips. Her employer had her leave work and she is hoping to get evaluated today. She has very limited range of motion. Her right side is fine. Please advise.

## 2017-10-02 ENCOUNTER — OFFICE VISIT (OUTPATIENT)
Dept: FAMILY MEDICINE CLINIC | Age: 32
End: 2017-10-02

## 2017-10-02 VITALS
RESPIRATION RATE: 20 BRPM | BODY MASS INDEX: 35.68 KG/M2 | WEIGHT: 209 LBS | DIASTOLIC BLOOD PRESSURE: 71 MMHG | TEMPERATURE: 98.8 F | OXYGEN SATURATION: 100 % | SYSTOLIC BLOOD PRESSURE: 118 MMHG | HEIGHT: 64 IN | HEART RATE: 71 BPM

## 2017-10-02 DIAGNOSIS — M54.32 SCIATICA OF LEFT SIDE: ICD-10-CM

## 2017-10-02 DIAGNOSIS — M54.12 RADICULOPATHY OF CERVICAL SPINE: Primary | ICD-10-CM

## 2017-10-02 RX ORDER — PREDNISONE 10 MG/1
TABLET ORAL
Qty: 21 TAB | Refills: 0 | Status: SHIPPED | OUTPATIENT
Start: 2017-10-02 | End: 2017-10-26 | Stop reason: ALTCHOICE

## 2017-10-02 NOTE — LETTER
NOTIFICATION OF RETURN TO WORK / SCHOOL 
 
10/2/2017 Ms. Sophia Ordoñez [address] [address] To Whom It May Concern: 
 
Andres Sosa was under the care of 3 [facility] She will return to work on 10/03/2017 with 25lb lifting restriction for 3 days than no restrictions. If there are questions or concerns please have the patient contact our office.  
 
 
 
Sincerely, 
 
 
Rafa Tjeada NP

## 2017-10-02 NOTE — PATIENT INSTRUCTIONS
Sciatica: Exercises  Your Care Instructions  Here are some examples of typical rehabilitation exercises for your condition. Start each exercise slowly. Ease off the exercise if you start to have pain. Your doctor or physical therapist will tell you when you can start these exercises and which ones will work best for you. When you are not being active, find a comfortable position for rest. Some people are comfortable on the floor or a medium-firm bed with a small pillow under their head and another under their knees. Some people prefer to lie on their side with a pillow between their knees. Don't stay in one position for too long. Take short walks (10 to 20 minutes) every 2 to 3 hours. Avoid slopes, hills, and stairs until you feel better. Walk only distances you can manage without pain, especially leg pain. How to do the exercises  Back stretches    1. Get down on your hands and knees on the floor. 2. Relax your head and allow it to droop. Round your back up toward the ceiling until you feel a nice stretch in your upper, middle, and lower back. Hold this stretch for as long as it feels comfortable, or about 15 to 30 seconds. 3. Return to the starting position with a flat back while you are on your hands and knees. 4. Let your back sway by pressing your stomach toward the floor. Lift your buttocks toward the ceiling. 5. Hold this position for 15 to 30 seconds. 6. Repeat 2 to 4 times. Follow-up care is a key part of your treatment and safety. Be sure to make and go to all appointments, and call your doctor if you are having problems. It's also a good idea to know your test results and keep a list of the medicines you take. Where can you learn more? Go to http://placido-willi.info/. Enter Y673 in the search box to learn more about \"Sciatica: Exercises. \"  Current as of: March 21, 2017  Content Version: 11.3  © 1857-9308 [employer].  Care instructions adapted under license by [address] (which disclaims liability or warranty for this information). If you have questions about a medical condition or this instruction, always ask your healthcare professional. [address] any warranty or liability for your use of this information. Pinched Nerve in the Neck: Care Instructions  Your Care Instructions  A pinched nerve in the neck happens when a vertebra or disc in the upper part of your spine is damaged. This damage can happen because of an injury. Or it can just happen with age. The changes caused by the damage may put pressure on a nearby nerve root, pinching it. This causes symptoms such as sharp pain in your neck, shoulder, arm, or back. You may also have tingling or numbness. Sometimes it makes your arm weaker. The symptoms are usually worse when you turn your head or strain your neck. For many people, the symptoms get better over time and finally go away. Early treatment usually includes medicines for pain and swelling. Sometimes physical therapy and special exercises may help. Follow-up care is a key part of your treatment and safety. Be sure to make and go to all appointments, and call your doctor if you are having problems. It's also a good idea to know your test results and keep a list of the medicines you take. How can you care for yourself at home? · Be safe with medicines. Read and follow all instructions on the label. ¨ If the doctor gave you a prescription medicine for pain, take it as prescribed. ¨ If you are not taking a prescription pain medicine, ask your doctor if you can take an over-the-counter medicine. · Try using a heating pad on a low or medium setting for 15 to 20 minutes every 2 or 3 hours. Try a warm shower in place of one session with the heating pad. You can also buy single-use heat wraps that last up to 8 hours. · You can also try an ice pack for 10 to 15 minutes every 2 to 3 hours.  There isn't strong evidence that either heat or ice will help. But you can try them to see if they help you. · Don't spend too long in one position. Take short breaks to move around and change positions. · Wear a seat belt and shoulder harness when you are in a car. · Sleep with a pillow under your head and neck that keeps your neck straight. · If you were given a neck brace (cervical collar) to limit neck motion, wear it as instructed for as many days as your doctor tells you to. Do not wear it longer than you were told to. Wearing a brace for too long can lead to neck stiffness and can weaken the neck muscles. · Follow your doctor's instructions for gentle neck-stretching exercises. · Do not smoke. Smoking can slow healing of your discs. If you need help quitting, talk to your doctor about stop-smoking programs and medicines. These can increase your chances of quitting for good. · Avoid strenuous work or exercise until your doctor says it is okay. When should you call for help? Call 911 anytime you think you may need emergency care. For example, call if:  · You are unable to move an arm or a leg at all. Call your doctor now or seek immediate medical care if:  · You have new or worse symptoms in your arms, legs, chest, belly, or buttocks. Symptoms may include:  ¨ Numbness or tingling. ¨ Weakness. ¨ Pain. · You lose bladder or bowel control. Watch closely for changes in your health, and be sure to contact your doctor if:  · You are not getting better as expected. Where can you learn more? Go to http://placido-willi.info/. Enter R669 in the search box to learn more about \"Pinched Nerve in the Neck: Care Instructions. \"  Current as of: March 21, 2017  Content Version: 11.3  © 2867-4196 [email or web address]. Care instructions adapted under license by [employer] (which disclaims liability or warranty for this information).  If you have questions about a medical condition or this instruction, always ask your healthcare professional. [place] [address] any warranty or liability for your use of this information.

## 2017-10-02 NOTE — PROGRESS NOTES
Chief Complaint   Patient presents with    Arm Pain     patient c/o LR arm pain, tingling and numberness x one week       pain scale 6/10    Back Pain     pain scale 2/10     1. Have you been to the ER, urgent care clinic since your last visit? Hospitalized since your last visit? No    2. Have you seen or consulted any other health care providers outside of the [address] [place] [address] since your last visit? Include any pap smears or colon screening.  No

## 2017-10-02 NOTE — PROGRESS NOTES
Subjective:     Vinita Kasper is a 29 y.o. female who complains of low back pain for 3 days, positional with bending or lifting, without radiation down the legs. Precipitating factors: none recalled by the patient. Prior history of back problems: recurrent self limited episodes of low back pain in the past. There is no numbness in the legs. Previously diagnosed with Sciatica. Describes this as same. Symptoms are worst: morning and evening. Alleviating factors identifiable by patient are standing. Exacerbating factors identifiable by patient are sitting, walking, bending forwards. Neck Pain  Patient complains of neck pain. Onset of symptoms was 1 week ago, unchanged since that time. Current symptoms are pain in cervical spine area c4 radiating to shoulder and down arm with tingling in 3rd and 4th digit. (aching in character; 4/10 in severity). Patient denies numbness, paresthesias in upper extremities. Patient denies weakness, diminished  strength, lack of coordination. Radiation of pain:  Event that precipitate these symptoms: none known. Patient has had recurrent self limited episodes of neck pain in the past.  Previous treatments include: medication: muscle relaxant: steroids. Allergy to NSAIDS prevents use.     Patient Active Problem List   Diagnosis Code    Asthma J45.909    PUD (peptic ulcer disease) K27.9    Chronic abdominal pain R10.9, G89.29    Chronic constipation K59.09    Chronic tension-type headache, not intractable G44.229     Patient Active Problem List    Diagnosis Date Noted    Chronic abdominal pain 11/23/2016    Chronic constipation 11/23/2016    Chronic tension-type headache, not intractable 11/23/2016    Asthma 05/02/2016    PUD (peptic ulcer disease) 05/02/2016     Current Outpatient Prescriptions   Medication Sig Dispense Refill    predniSONE (STERAPRED DS) 10 mg dose pack See administration instruction per 10mg dose pack 21 Tab 0    albuterol (PROVENTIL HFA, VENTOLIN HFA, PROAIR HFA) 90 mcg/actuation inhaler Take 2 Puffs by inhalation every four (4) hours as needed for Wheezing. 1 Inhaler 11    EPINEPHrine (EPIPEN) 0.3 mg/0.3 mL injection 0.3 mL by IntraMUSCular route once as needed. 1 Syringe 0    inhalational spacing device 1 Each by Does Not Apply route as needed. 1 Each 0    esomeprazole (NEXIUM) 40 mg capsule Take 1 Cap by mouth daily. 30 Cap 3    mometasone-formoterol (DULERA) 200-5 mcg/actuation HFA inhaler Take 2 Puffs by inhalation two (2) times a day.  albuterol (PROVENTIL VENTOLIN) 2.5 mg /3 mL (0.083 %) nebulizer solution by Nebulization route as needed.  amitriptyline (ELAVIL) 25 mg tablet Take 1 Tab by mouth nightly. 30 Tab 1     Allergies   Allergen Reactions    Nsaids (Non-Steroidal Anti-Inflammatory Drug) Nausea and Vomiting    Percocet [Oxycodone-Acetaminophen] Nausea and Vomiting    Tramadol Other (comments)     Abdominal pain     Past Medical History:   Diagnosis Date    Asthma     Gastrointestinal disorder     ulcers    Ovarian cyst     Ulcer ([facility] [address]      Past Surgical History:   Procedure Laterality Date    HX ORTHOPAEDIC      bilateral knee surgery     Family History   Problem Relation Age of Onset    Diabetes Maternal Grandmother     Cancer Neg Hx     Heart Disease Neg Hx     Hypertension Neg Hx     Stroke Neg Hx      Social History   Substance Use Topics    Smoking status: Never Smoker    Smokeless tobacco: Never Used    Alcohol use Yes        Review of Systems  A comprehensive review of systems was negative except for that written in the HPI. Objective:     Visit Vitals    /71 (BP 1 Location: Right arm, BP Patient Position: Sitting)    Pulse 71    Temp 98.8 °F (37.1 °C) (Oral)    Resp 20    Ht 5' 4\" (1.626 m)    Wt 209 lb (94.8 kg)    SpO2 100%    BMI 35.87 kg/m2      Patient appears to be in mild to moderate pain, antalgic gait noted. Lumbosacral spine area reveals no local tenderness or mass. Painful and reduced LS ROM noted. Straight leg raise is negative at 30 degrees on left decreasing concern for herniation. DTR's, motor strength and sensation normal, including heel and toe gait. Peripheral pulses are palpable. Cervical spine is tender to palpation at c 4 with tenderness increased across shoulder and into deltoid. Tingling but no numbness described when area is palpated. Full ROM on exam of area. No loss of strength  5/5. Assessment/Plan:   Sciatica and cevical spine radiculopathy    For acute pain, rest, intermittent application of heat (do not sleep on heating pad), analgesics are recommended can only take tylenol. Patient also already has muscle relaxer at home and declines need for refill. Discussed longer term treatment plan would normally be NSAIDS but patient has allergy to these so we discussed  a home back care exercise program with flexion exercise routine. Proper lifting with avoidance of heavy lifting discussed. Consider Physical Therapy and XRay studies if not improving. Call or return to clinic prn if these symptoms worsen or fail to improve as anticipated. Will also treat with steroid taper pack as NSAIDS are not able to be used. For cervical pain also consider massage therapy as this has helped in past and follow up in 14 days for re eval if not improved will  do x ray and refer to PT. ICD-10-CM ICD-9-CM    1. Radiculopathy of cervical spine M54.12 723.4    2. Sciatica of left side M54.32 724.3      Encounter Diagnoses   Name Primary?  Radiculopathy of cervical spine Yes    Sciatica of left side      Orders Placed This Encounter    predniSONE (STERAPRED DS) 10 mg dose pack       Diagnoses and all orders for this visit:    1. Radiculopathy of cervical spine    2.  Sciatica of left side    Other orders  -     predniSONE (STERAPRED DS) 10 mg dose pack; See administration instruction per 10mg dose pack      Follow-up Disposition:  Return in about 14 days (around 10/16/2017). Consider x ray and PT if not improved at that time.

## 2017-10-26 ENCOUNTER — OFFICE VISIT (OUTPATIENT)
Dept: FAMILY MEDICINE CLINIC | Age: 32
End: 2017-10-26

## 2017-10-26 ENCOUNTER — HOSPITAL ENCOUNTER (OUTPATIENT)
Dept: LAB | Age: 32
Discharge: HOME | End: 2017-10-26

## 2017-10-26 VITALS
SYSTOLIC BLOOD PRESSURE: 130 MMHG | TEMPERATURE: 98.1 F | RESPIRATION RATE: 16 BRPM | HEART RATE: 65 BPM | DIASTOLIC BLOOD PRESSURE: 78 MMHG | WEIGHT: 203 LBS | BODY MASS INDEX: 34.66 KG/M2 | OXYGEN SATURATION: 99 % | HEIGHT: 64 IN

## 2017-10-26 DIAGNOSIS — R50.9 FUO (FEVER OF UNKNOWN ORIGIN): ICD-10-CM

## 2017-10-26 DIAGNOSIS — M25.50 ARTHRALGIA, UNSPECIFIED JOINT: Primary | ICD-10-CM

## 2017-10-26 DIAGNOSIS — M79.10 MYALGIA: ICD-10-CM

## 2017-10-26 PROCEDURE — 99001 SPECIMEN HANDLING PT-LAB: CPT | Performed by: INTERNAL MEDICINE

## 2017-10-26 RX ORDER — ALBUTEROL SULFATE 0.83 MG/ML
2.5 SOLUTION RESPIRATORY (INHALATION)
Qty: 24 EACH | Refills: 3 | Status: SHIPPED | OUTPATIENT
Start: 2017-10-26 | End: 2018-06-21 | Stop reason: SDUPTHER

## 2017-10-26 NOTE — PROGRESS NOTES
Wilton Noland is a 29 y.o. female (: 1985) presenting to address:    Chief Complaint   Patient presents with    Swelling     follow up from visit 10/2     Fever    Generalized Body Aches     x 1 month        Vitals:    10/26/17 0755   BP: 130/78   Pulse: 65   Resp: 16   Temp: 98.1 °F (36.7 °C)   TempSrc: Oral   SpO2: 99%   Weight: 203 lb (92.1 kg)   Height: 5' 4\" (1.626 m)   PainSc:   3   PainLoc: Generalized   LMP: 10/12/2017       Hearing/Vision:   No exam data present    Learning Assessment:     Learning Assessment 2016   PRIMARY LEARNER Patient   HIGHEST LEVEL OF EDUCATION - PRIMARY LEARNER  2 YEARS OF COLLEGE   BARRIERS PRIMARY LEARNER NONE   CO-LEARNER CAREGIVER No   PRIMARY LANGUAGE ENGLISH    NEED No   LEARNER PREFERENCE PRIMARY READING   LEARNING SPECIAL TOPICS none   ANSWERED BY patient   RELATIONSHIP SELF   ASSESSMENT COMMENT n/a     Depression Screening:     PHQ over the last two weeks 10/2/2017   Little interest or pleasure in doing things Not at all   Feeling down, depressed or hopeless Not at all   Total Score PHQ 2 0     Fall Risk Assessment:   No flowsheet data found. Abuse Screening:     Abuse Screening Questionnaire 2016   Do you ever feel afraid of your partner? N   Are you in a relationship with someone who physically or mentally threatens you? N   Is it safe for you to go home? Y     Coordination of Care Questionaire:   1. Have you been to the ER, urgent care clinic since your last visit? Hospitalized since your last visit? NO    2. Have you seen or consulted any other health care providers outside of the [address] [place] [address] since your last visit? Include any pap smears or colon screening. NO    Advanced Directive:   1. Do you have an Advanced Directive? NO    2. Would you like information on Advanced Directives?  NO

## 2017-10-26 NOTE — MR AVS SNAPSHOT
Visit Information Date & Time Provider Department Dept. Phone Encounter #  
 10/26/2017  7:45 AM Mari Parkinson, [employer] [id number] [phone] [id number] Upcoming Health Maintenance Date Due Pneumococcal 19-64 Medium Risk (1 of 1 - PPSV23) 8/15/2006 INFLUENZA AGE 9 TO ADULT 8/1/2017 PAP AKA CERVICAL CYTOLOGY 7/24/2020 DTaP/Tdap/Td series (2 - Td) 7/11/2027 Allergies as of 10/26/2017  Review Complete On: 10/26/2017 By: Mari Parkinson MD  
  
 Severity Noted Reaction Type Reactions Nsaids (Non-steroidal Anti-inflammatory Drug)  02/05/2015    Nausea and Vomiting Percocet [Oxycodone-acetaminophen]  11/16/2015    Nausea and Vomiting Tramadol  03/15/2016    Other (comments) Abdominal pain Current Immunizations  Never Reviewed Name Date  
 TB Skin Test (PPD) Intradermal 7/11/2017  2:10 PM  
 Tdap 7/11/2017  2:27 PM  
  
 Not reviewed this visit You Were Diagnosed With   
  
 Codes Comments Arthralgia, unspecified joint    -  Primary ICD-10-CM: M25.50 ICD-9-CM: 719.40 Myalgia     ICD-10-CM: M79.1 ICD-9-CM: 729.1 FUO (fever of unknown origin)     ICD-10-CM: R50.9 ICD-9-CM: 780.60 Vitals BP Pulse Temp Resp Height(growth percentile) Weight(growth percentile) 130/78 (BP 1 Location: Left arm, BP Patient Position: Sitting) 65 98.1 °F (36.7 °C) (Oral) 16 5' 4\" (1.626 m) 203 lb (92.1 kg) LMP SpO2 BMI OB Status Smoking Status 10/12/2017 99% 34.84 kg/m2 Having regular periods Never Smoker BMI and BSA Data Body Mass Index Body Surface Area 34.84 kg/m 2 2.04 m 2 Preferred Pharmacy Pharmacy Name Phone [address] [facility], [address] [place], [place] [address]way [address] [facility] [address] [phone] Your Updated Medication List  
  
   
This list is accurate as of: 10/26/17  8:11 AM.  Always use your most recent med list.  
  
  
  
  
 * albuterol 90 mcg/actuation inhaler Commonly known as:  PROVENTIL HFA, VENTOLIN HFA, PROAIR HFA Take 2 Puffs by inhalation every four (4) hours as needed for Wheezing. * albuterol 2.5 mg /3 mL (0.083 %) nebulizer solution Commonly known as:  PROVENTIL VENTOLIN  
3 mL by Nebulization route every four (4) hours as needed. amitriptyline 25 mg tablet Commonly known as:  ELAVIL Take 1 Tab by mouth nightly. EPINEPHrine 0.3 mg/0.3 mL injection Commonly known as:  EPIPEN  
0.3 mL by IntraMUSCular route once as needed. esomeprazole 40 mg capsule Commonly known as:  NexIUM Take 1 Cap by mouth daily. inhalational spacing device 1 Each by Does Not Apply route as needed. * Notice: This list has 2 medication(s) that are the same as other medications prescribed for you. Read the directions carefully, and ask your doctor or other care provider to review them with you. Prescriptions Sent to Pharmacy Refills  
 albuterol (PROVENTIL VENTOLIN) 2.5 mg /3 mL (0.083 %) nebulizer solution 3 Sig: 3 mL by Nebulization route every four (4) hours as needed. Class: Normal  
 Pharmacy: [facility] [address] [place] [address] [address] Ph #: [phone] Route: Nebulization To-Do List   
 10/26/2017 Lab:  JABARI QL, W/REFLEX CASCADE   
  
 10/26/2017 Lab:  CBC WITH AUTOMATED DIFF   
  
 10/26/2017 Lab:  CK   
  
 10/26/2017 Lab:  CRP, HIGH SENSITIVITY   
  
 10/26/2017 Lab:  CYCLIC CITRUL PEPTIDE AB, IGG   
  
 10/26/2017 Lab:  HEMOGLOBIN FRACTIONATION   
  
 10/26/2017 Lab:  HIV 1/2 AG/AB, 4TH GENERATION,W RFLX CONFIRM   
  
 10/26/2017 Lab:  LYME AB TOTAL W/RFLX W BLOT   
  
 10/26/2017 Lab:  METABOLIC PANEL, COMPREHENSIVE   
  
 10/26/2017 Lab:  RHEUMATOID FACTOR, IGM   
  
 10/26/2017 Lab:  SED RATE (ESR)   
  
 10/26/2017   Lab:  SYSTEMIC LUPUS PROFILE A   
 10/26/2017 Lab:  URINALYSIS W/ RFLX MICROSCOPIC Introducing [address]! [facility] introduces [employer] patient portal. Now you can access parts of your medical record, email your doctor's office, and request medication refills online. 1. In your internet browser, go to [email or web address] 2. Click on the First Time User? Click Here link in the Sign In box. You will see the New Member Sign Up page. 3. Enter your [employer] Access Code exactly as it appears below. You will not need to use this code after youve completed the sign-up process. If you do not sign up before the expiration date, you must request a new code. · [employer] Access Code: [email or web address] Expires: 1/24/2018  8:11 AM 
 
4. Enter the last four digits of your Social Security Number (xxxx) and Date of Birth (mm/dd/yyyy) as indicated and click Submit. You will be taken to the next sign-up page. 5. Create a [employer] ID. This will be your [employer] login ID and cannot be changed, so think of one that is secure and easy to remember. 6. Create a [employer] password. You can change your password at any time. 7. Enter your Password Reset Question and Answer. This can be used at a later time if you forget your password. 8. Enter your e-mail address. You will receive e-mail notification when new information is available in [address] 9. Click Sign Up. You can now view and download portions of your medical record. 10. Click the Download Summary menu link to download a portable copy of your medical information. If you have questions, please visit the Frequently Asked Questions section of the [employer] website. Remember, [employer] is NOT to be used for urgent needs. For medical emergencies, dial 911. Now available from your iPhone and Android! Please provide this summary of care documentation to your next provider. Lyme Disease Testing Disclaimer: § 41.1-8926.1. (Expires July 1, 2018) Lyme disease testing information disclosure. A. Every licensee or his in-office designee who orders a laboratory test for the presence of Lyme disease shall provide to the patient or his legal representative the following written information: \"ACCORDING TO THE CENTERS FOR DISEASE CONTROL AND PREVENTION, AS OF 2011 LYME DISEASE IS THE SIXTH FASTEST GROWING DISEASE IN THE [place]. YOUR HEALTH CARE PROVIDER HAS ORDERED A LABORATORY TEST FOR THE PRESENCE OF LYME DISEASE FOR YOU. CURRENT LABORATORY TESTING FOR LYME DISEASE CAN BE PROBLEMATIC AND STANDARD LABORATORY TESTS OFTEN RESULT IN FALSE NEGATIVE AND FALSE POSITIVE RESULTS, AND IF DONE TOO EARLY, YOU MAY NOT HAVE PRODUCED ENOUGH ANTIBODIES TO BE CONSIDERED POSITIVE BECAUSE YOUR IMMUNE RESPONSE REQUIRES TIME TO DEVELOP ANTIBODIES. IF YOU ARE TESTED FOR LYME DISEASE, AND THE RESULTS ARE NEGATIVE, THIS DOES NOT NECESSARILY MEAN YOU DO NOT HAVE LYME DISEASE. IF YOU CONTINUE TO EXPERIENCE SYMPTOMS, YOU SHOULD CONTACT YOUR HEALTH CARE PROVIDER AND INQUIRE ABOUT THE APPROPRIATENESS OF RETESTING OR ADDITIONAL TREATMENT. \"  
B. Licensees shall be immune from civil liability for the provision of the written information required by this section absent gross negligence or willful misconduct. Your primary care clinician is listed as Robe Luis. If you have any questions after today's visit, please call [phone].

## 2017-10-26 NOTE — PROGRESS NOTES
Assessment/Plan:    1. Arthralgia, unspecified joint, myalgia and FUO- ddx broad, including autoimmune d/o like SLE, RA, infectious such as lyme, HIV. Get labs. - JABARI QL, W/REFLEX CASCADE; Future  - CYCLIC CITRUL PEPTIDE AB, IGG; Future  - RHEUMATOID FACTOR, IGM; Future  - HEMOGLOBIN FRACTIONATION; Future  - SYSTEMIC LUPUS PROFILE A; Future  - METABOLIC PANEL, COMPREHENSIVE; Future  - LYME AB TOTAL W/RFLX W BLOT; Future  - HIV 1/2 AG/AB, 4TH GENERATION,W RFLX CONFIRM; Future  - CRP, HIGH SENSITIVITY; Future  - SED RATE (ESR); Future      The plan was discussed with the patient. The patient verbalized understanding and is in agreement with the plan. All medication potential side effects were discussed with the patient. Health Maintenance:   Health Maintenance   Topic Date Due    Pneumococcal 19-64 Medium Risk (1 of 1 - PPSV23) 08/15/2006    INFLUENZA AGE 9 TO ADULT  08/01/2017    PAP AKA CERVICAL CYTOLOGY  07/24/2020    DTaP/Tdap/Td series (2 - Td) 07/11/2027       Matty Sosa is a 29 y.o. female and presents with Swelling (follow up from visit 10/2 ); Fever; and Generalized Body Aches (x 1 month )     Subjective:  Pt c/o 1 mo h/o generalized aches-arthralgia and myalgia (upper and lower body). She is getting fevers (T100.6-T101). +anorexia. No rash. Pain is constant, varying in intensity. She saw NP 3 weeks ago, treated with predpak for cervical radicular pain. This didn't improve her sx. She will have swelling in her hands. She has family h/o SLE. No h/o tick bites. ROS:  Constitutional: No recent weight change. No weakness/fatigue.  + f/c.   Skin: No rashes, change in nails/hair, itching   HENT: No HA, dizziness. No hearing loss/tinnitus. No nasal congestion/discharge. Eyes: No change in vision, double/blurred vision or eye pain/redness. Cardiovascular: No CP/palpitations. No CHRISTIANSEN/orthopnea/PND. Respiratory: No cough/sputum, dyspnea, wheezing.    Gastointestinal: No dysphagia, reflux. No n/v. No constipation/diarrhea. No melena/rectal bleeding. Genitourinary: No dysuria, urinary hesitancy, nocturia, hematuria. No incontinence. Musculoskeletal: + joint pain/stiffness. + muscle pain/tenderness. Neurological: No seizures/numbness/weakness. No paresthesias. The problem list was updated as a part of today's visit. Patient Active Problem List   Diagnosis Code    Asthma J45.909    PUD (peptic ulcer disease) K27.9    Chronic abdominal pain R10.9, G89.29    Chronic constipation K59.09    Chronic tension-type headache, not intractable G44.229       The PSH, FH were reviewed. SH:  Social History   Substance Use Topics    Smoking status: Never Smoker    Smokeless tobacco: Never Used    Alcohol use Yes       Medications/Allergies:  Current Outpatient Prescriptions on File Prior to Visit   Medication Sig Dispense Refill    albuterol (PROVENTIL HFA, VENTOLIN HFA, PROAIR HFA) 90 mcg/actuation inhaler Take 2 Puffs by inhalation every four (4) hours as needed for Wheezing. 1 Inhaler 11    EPINEPHrine (EPIPEN) 0.3 mg/0.3 mL injection 0.3 mL by IntraMUSCular route once as needed. 1 Syringe 0    amitriptyline (ELAVIL) 25 mg tablet Take 1 Tab by mouth nightly. 30 Tab 1    inhalational spacing device 1 Each by Does Not Apply route as needed. 1 Each 0    esomeprazole (NEXIUM) 40 mg capsule Take 1 Cap by mouth daily. 30 Cap 3    albuterol (PROVENTIL VENTOLIN) 2.5 mg /3 mL (0.083 %) nebulizer solution by Nebulization route as needed. No current facility-administered medications on file prior to visit.          Allergies   Allergen Reactions    Nsaids (Non-Steroidal Anti-Inflammatory Drug) Nausea and Vomiting    Percocet [Oxycodone-Acetaminophen] Nausea and Vomiting    Tramadol Other (comments)     Abdominal pain       Objective:  Visit Vitals    /78 (BP 1 Location: Left arm, BP Patient Position: Sitting)    Pulse 65    Temp 98.1 °F (36.7 °C) (Oral)    Resp 16    Ht 5' 4\" (1.626 m)    Wt 203 lb (92.1 kg)    LMP 10/12/2017    SpO2 99%    BMI 34.84 kg/m2      Constitutional: Well developed, nourished, no distress, alert, obese habitus, nontoxic   HENT: Exterior ears and tympanic membranes normal bilaterally. Supple neck. No thyromegaly or lymphadenopathy. Oropharynx clear and moist mucous membranes. Poor oral dentition. Eyes: Conjunctiva normal. PERRL. CV: S1, S2.  RRR. No murmurs/rubs. No thrills palpated. No carotid bruits. Intact distal pulses. No edema. Pulm: No abnormalities on inspection. Clear to auscultation bilaterally. No wheezing/rhonchi. Normal effort. GI: Soft, diffusely tender, no rebound or guarding, nondistended. Normal active bowel sounds. MS: Gait normal.  +diffusely tender muscles upper and lower extremities. No synovial inflammation. +pain with palpation of all joints in hands. Neuro: A/O x 3. No focal motor or sensory deficits. Speech normal.   Skin: No lesions/rashes on inspection. Psych: Appropriate affect, judgement and insight. Short-term memory intact.

## 2017-11-06 ENCOUNTER — TELEPHONE (OUTPATIENT)
Dept: FAMILY MEDICINE CLINIC | Age: 32
End: 2017-11-06

## 2017-11-06 NOTE — TELEPHONE ENCOUNTER
Called to inform pt that per Miguelina(who spoke with [facility]) the lab results would be in by Wednesday, the Rheumatoid test was holding up getting in the rest of the tests.

## 2017-11-06 NOTE — TELEPHONE ENCOUNTER
Pt called stating she was told to take her muscle relaxer and ibprofen while waiting for lab results however she did not realize she only had one pill left. Pt states medication was prescribed by the ER and she would like a refill if possible.

## 2017-11-07 ENCOUNTER — TELEPHONE (OUTPATIENT)
Dept: FAMILY MEDICINE CLINIC | Age: 32
End: 2017-11-07

## 2017-11-07 NOTE — TELEPHONE ENCOUNTER
Pt called to see if she can get prescription refill for Diazepam.  I did not see this on her med list, she stated it was prescribed from ER a few months ago and she would like a new prescription for her pain.  Please f/u

## 2017-11-08 LAB
ALBUMIN SERPL-MCNC: 4.4 G/DL
ALBUMIN/GLOB SERPL: 1.6 {RATIO}
ALP SERPL-CCNC: 46 IU/L
ALT SERPL-CCNC: 14 IU/L
ANA SER QL: NEGATIVE
APPEARANCE UR: ABNORMAL
AST SERPL-CCNC: 14 IU/L
B BURGDOR IGG+IGM SER-ACNC: <0.91 ISR (ref 0–0.9)
BASOPHILS # BLD AUTO: 0 X10E3/UL
BASOPHILS NFR BLD AUTO: 0 %
BILIRUB SERPL-MCNC: 0.2 MG/DL
BILIRUB UR QL STRIP: NEGATIVE
BUN SERPL-MCNC: 8 MG/DL
BUN/CREAT SERPL: 10
CALCIUM SERPL-MCNC: 9.4 MG/DL
CCP IGA+IGG SERPL IA-ACNC: 16 UNITS (ref 0–19)
CHLORIDE SERPL-SCNC: 101 MMOL/L (ref 96–106)
CHROMATIN AB SERPL-ACNC: <0.2 AI (ref 0–0.9)
CK SERPL-CCNC: 130 U/L (ref 24–173)
CO2 SERPL-SCNC: 24 MMOL/L
COLOR UR: YELLOW
CREAT SERPL-MCNC: 0.82 MG/DL
CRP SERPL HS-MCNC: 1.43 MG/L (ref 0–3)
DSDNA AB SER-ACNC: 1 IU/ML (ref 0–9)
ENA RNP AB SER-ACNC: <0.2 AI (ref 0–0.9)
ENA SM AB SER-ACNC: <0.2 AI (ref 0–0.9)
ENA SS-A AB SER-ACNC: <0.2 AI (ref 0–0.9)
ENA SS-B AB SER-ACNC: <0.2 AI (ref 0–0.9)
EOSINOPHIL # BLD AUTO: 0.1 X10E3/UL
EOSINOPHIL NFR BLD AUTO: 1 %
ERYTHROCYTE [DISTWIDTH] IN BLOOD BY AUTOMATED COUNT: 13.5 %
ERYTHROCYTE [SEDIMENTATION RATE] IN BLOOD BY WESTERGREN METHOD: 2 MM/HR
GFR SERPLBLD CREATININE-BSD FMLA CKD-EPI: ABNORMAL ML/MIN/1.73
GFR SERPLBLD CREATININE-BSD FMLA CKD-EPI: ABNORMAL ML/MIN/1.73
GLOBULIN SER CALC-MCNC: 2.7 G/DL (ref 1.5–4.5)
GLUCOSE SERPL-MCNC: 106 MG/DL (ref 65–99)
GLUCOSE UR QL: NEGATIVE
HCT VFR BLD AUTO: 39.1 %
HGB A MFR BLD: 97.9 % (ref 94–98)
HGB A2 MFR BLD COLUMN CHROM: 2.1 % (ref 0.7–3.1)
HGB BLD-MCNC: 12.9 G/DL
HGB C MFR BLD: 0 %
HGB F MFR BLD: 0 %
HGB FRACT BLD-IMP: NORMAL
HGB S BLD QL SOLY: NEGATIVE
HGB S MFR BLD: 0 %
HGB UR QL STRIP: NEGATIVE
HIV 1+2 AB+HIV1 P24 AG SERPL QL IA: NON REACTIVE
IMM GRANULOCYTES # BLD: 0 X10E3/UL
IMM GRANULOCYTES NFR BLD: 0 %
KETONES UR QL STRIP: ABNORMAL
LEUKOCYTE ESTERASE UR QL STRIP: NEGATIVE
LYMPHOCYTES # BLD AUTO: 2.1 X10E3/UL
LYMPHOCYTES NFR BLD AUTO: 40 %
MCH RBC QN AUTO: 27.9 PG
MCHC RBC AUTO-ENTMCNC: 33 G/DL
MCV RBC AUTO: 84 FL
MICRO URNS: ABNORMAL
MONOCYTES # BLD AUTO: 0.3 X10E3/UL
MONOCYTES NFR BLD AUTO: 6 %
NEUTROPHILS # BLD AUTO: 2.8 X10E3/UL
NEUTROPHILS NFR BLD AUTO: 53 %
NITRITE UR QL STRIP: NEGATIVE
PH UR STRIP: 5.5 [PH] (ref 5–7.5)
PLATELET # BLD AUTO: 360 X10E3/UL
POTASSIUM SERPL-SCNC: 4.4 MMOL/L
PROT SERPL-MCNC: 7.1 G/DL
PROT UR QL STRIP: NEGATIVE
RBC # BLD AUTO: 4.63 X10E6/UL
RHEUMATOID FACT SERPL-ACNC: <10 IU/ML (ref 0–13.9)
RHEUMATOID FACTOR LEVELS: 5.9 IU/ML (ref 0–15)
SEE BELOW:, 164879: NORMAL
SODIUM SERPL-SCNC: 140 MMOL/L (ref 134–144)
SP GR UR: >=1.03 (ref 1–1.03)
UROBILINOGEN UR STRIP-MCNC: 1 MG/DL (ref 0.2–1)
WBC # BLD AUTO: 5.3 X10E3/UL

## 2017-11-08 RX ORDER — CYCLOBENZAPRINE HCL 10 MG
10 TABLET ORAL
Qty: 30 TAB | Refills: 0 | Status: SHIPPED | OUTPATIENT
Start: 2017-11-08 | End: 2018-03-14 | Stop reason: ALTCHOICE

## 2017-11-08 NOTE — TELEPHONE ENCOUNTER
Pt had Diazepam. Dr Josselin Olea is sending an alternative of Flexeril. Pt aware. No lab results yet.

## 2017-11-08 NOTE — TELEPHONE ENCOUNTER
Pt called back to check status. She states she is in a lot of pain and does not want to go to the ER.  She is also requesting results of labs that were sent to [facility] on 10/26

## 2017-11-08 NOTE — TELEPHONE ENCOUNTER
Spoke to pt. Dr Levert Opitz giving her Flexeril and [place] states they are faxing all labs except for rheumatoid today. Rheumatoid factor was forwarded to a speciality lab in [facility] who should have results today.

## 2017-11-09 NOTE — PROGRESS NOTES
Pt aware of results. She is still having severe pain off and on. She started the muscle relaxer and so far it has just made her dizzy. She will monitor symptoms and call back.

## 2017-11-14 ENCOUNTER — TELEPHONE (OUTPATIENT)
Dept: FAMILY MEDICINE CLINIC | Age: 32
End: 2017-11-14

## 2017-11-14 RX ORDER — NEBULIZER AND COMPRESSOR
EACH MISCELLANEOUS
Qty: 1 EACH | Refills: 0 | Status: CANCELLED | OUTPATIENT
Start: 2017-11-14

## 2017-11-14 NOTE — TELEPHONE ENCOUNTER
Pt called and left a message with the answering service. In the message she stated that she needs a new order for a nebulizer because her machine is broken please advise.

## 2018-03-14 ENCOUNTER — OFFICE VISIT (OUTPATIENT)
Dept: FAMILY MEDICINE CLINIC | Age: 33
End: 2018-03-14

## 2018-03-14 VITALS
TEMPERATURE: 97.9 F | HEART RATE: 72 BPM | HEIGHT: 64 IN | SYSTOLIC BLOOD PRESSURE: 109 MMHG | RESPIRATION RATE: 17 BRPM | OXYGEN SATURATION: 100 % | WEIGHT: 206 LBS | DIASTOLIC BLOOD PRESSURE: 78 MMHG | BODY MASS INDEX: 35.17 KG/M2

## 2018-03-14 DIAGNOSIS — G44.229 CHRONIC TENSION-TYPE HEADACHE, NOT INTRACTABLE: Primary | ICD-10-CM

## 2018-03-14 DIAGNOSIS — R51.9 HEADACHE, CHRONIC DAILY: ICD-10-CM

## 2018-03-14 RX ORDER — AMITRIPTYLINE HYDROCHLORIDE 25 MG/1
25 TABLET, FILM COATED ORAL
Qty: 30 TAB | Refills: 2 | Status: SHIPPED | OUTPATIENT
Start: 2018-03-14 | End: 2019-07-11 | Stop reason: SDUPTHER

## 2018-03-14 NOTE — PROGRESS NOTES
Jennifer Engel is a 29 y.o. female (: 1985) presenting to address:migraine x1 week    Chief Complaint   Patient presents with    Migraine     x1 week        Vitals:    18 1349   BP: 109/78   Pulse: 72   Resp: 17   Temp: 97.9 °F (36.6 °C)   TempSrc: Oral   SpO2: 100%   Weight: 206 lb (93.4 kg)   Height: 5' 4\" (1.626 m)   PainSc:   4   PainLoc: Head   LMP: 2018       Hearing/Vision:   No exam data present    Learning Assessment:     Learning Assessment 2016   PRIMARY LEARNER Patient   HIGHEST LEVEL OF EDUCATION - PRIMARY LEARNER  2 YEARS OF COLLEGE   BARRIERS PRIMARY LEARNER NONE   CO-LEARNER CAREGIVER No   PRIMARY LANGUAGE ENGLISH    NEED No   LEARNER PREFERENCE PRIMARY READING   LEARNING SPECIAL TOPICS none   ANSWERED BY patient   RELATIONSHIP SELF   ASSESSMENT COMMENT n/a     Depression Screening:     PHQ over the last two weeks 10/2/2017   Little interest or pleasure in doing things Not at all   Feeling down, depressed or hopeless Not at all   Total Score PHQ 2 0     Fall Risk Assessment:   No flowsheet data found. Abuse Screening:     Abuse Screening Questionnaire 2016   Do you ever feel afraid of your partner? N   Are you in a relationship with someone who physically or mentally threatens you? N   Is it safe for you to go home? Y     Coordination of Care Questionaire:   1. Have you been to the ER, urgent care clinic since your last visit? Hospitalized since your last visit? no    2. Have you seen or consulted any other health care providers outside of the [address] [place] [address] since your last visit? Include any pap smears or colon screening. no    Advanced Directive:   1. Do you have an Advanced Directive? no    2. Would you like information on Advanced Directives? No    Patient has already received flu vaccine.

## 2018-03-14 NOTE — MR AVS SNAPSHOT
[address]  [address] [facility] [address] 
[phone] Patient: Brant Mack MRN: [id number] LBD:[birth year] Visit Information Date & Time Provider Department Dept. Phone Encounter #  
 3/14/2018  2:00 PM Janel Kevin, [address] [phone] [id number] Follow-up Instructions Return if symptoms worsen or fail to improve. Upcoming Health Maintenance Date Due Pneumococcal 19-64 Medium Risk (1 of 1 - PPSV23) 8/15/2006 PAP AKA CERVICAL CYTOLOGY 7/24/2020 DTaP/Tdap/Td series (2 - Td) 7/11/2027 Allergies as of 3/14/2018  Review Complete On: 3/14/2018 By: Janel Kevin NP Severity Noted Reaction Type Reactions Nsaids (Non-steroidal Anti-inflammatory Drug)  02/05/2015    Nausea and Vomiting Percocet [Oxycodone-acetaminophen]  11/16/2015    Nausea and Vomiting Tramadol  03/15/2016    Other (comments) Abdominal pain Current Immunizations  Never Reviewed Name Date  
 TB Skin Test (PPD) Intradermal 7/11/2017  2:10 PM  
 Tdap 7/11/2017  2:27 PM  
  
 Not reviewed this visit You Were Diagnosed With   
  
 Codes Comments Chronic tension-type headache, not intractable    -  Primary ICD-10-CM: V85.313 ICD-9-CM: 339.12 Headache, chronic daily     ICD-10-CM: R51 ICD-9-CM: 044. 0 Vitals BP Pulse Temp Resp Height(growth percentile) Weight(growth percentile) 109/78 (BP 1 Location: Right arm, BP Patient Position: Sitting) 72 97.9 °F (36.6 °C) (Oral) 17 5' 4\" (1.626 m) 206 lb (93.4 kg) LMP SpO2 BMI OB Status Smoking Status 03/01/2018 100% 35.36 kg/m2 Having regular periods Never Smoker BMI and BSA Data Body Mass Index Body Surface Area  
 35.36 kg/m 2 2.05 m 2 Preferred Pharmacy Pharmacy Name Phone [facility] [phone] [place] [address] [phone] Your Updated Medication List  
  
   
This list is accurate as of 3/14/18  2:25 PM.  Always use your most recent med list.  
  
  
  
  
 * albuterol 90 mcg/actuation inhaler Commonly known as:  PROVENTIL HFA, VENTOLIN HFA, PROAIR HFA Take 2 Puffs by inhalation every four (4) hours as needed for Wheezing. * albuterol 2.5 mg /3 mL (0.083 %) nebulizer solution Commonly known as:  PROVENTIL VENTOLIN  
3 mL by Nebulization route every four (4) hours as needed. amitriptyline 25 mg tablet Commonly known as:  ELAVIL Take 1 Tab by mouth nightly. EPINEPHrine 0.3 mg/0.3 mL injection Commonly known as:  EPIPEN  
0.3 mL by IntraMUSCular route once as needed. esomeprazole 40 mg capsule Commonly known as:  NexIUM Take 1 Cap by mouth daily. inhalational spacing device 1 Each by Does Not Apply route as needed. * Notice: This list has 2 medication(s) that are the same as other medications prescribed for you. Read the directions carefully, and ask your doctor or other care provider to review them with you. Prescriptions Sent to Pharmacy Refills  
 amitriptyline (ELAVIL) 25 mg tablet 2 Sig: Take 1 Tab by mouth nightly. Class: Normal  
 Pharmacy: [facility] [address] [place] [address] [phone] Ph #: [phone] Route: Oral  
  
Follow-up Instructions Return if symptoms worsen or fail to improve. Patient Instructions Headache: Care Instructions Your Care Instructions Headaches have many possible causes. Most headaches aren't a sign of a more serious problem, and they will get better on their own. Home treatment may help you feel better faster. The doctor has checked you carefully, but problems can develop later. If you notice any problems or new symptoms, get medical treatment right away. Follow-up care is a key part of your treatment and safety.  Be sure to make and go to all appointments, and call your doctor if you are having problems. It's also a good idea to know your test results and keep a list of the medicines you take. How can you care for yourself at home? · Do not drive if you have taken a prescription pain medicine. · Rest in a quiet, dark room until your headache is gone. Close your eyes and try to relax or go to sleep. Don't watch TV or read. · Put a cold, moist cloth or cold pack on the painful area for 10 to 20 minutes at a time. Put a thin cloth between the cold pack and your skin. · Use a warm, moist towel or a heating pad set on low to relax tight shoulder and neck muscles. · Have someone gently massage your neck and shoulders. · Take pain medicines exactly as directed. ¨ If the doctor gave you a prescription medicine for pain, take it as prescribed. ¨ If you are not taking a prescription pain medicine, ask your doctor if you can take an over-the-counter medicine. · Be careful not to take pain medicine more often than the instructions allow, because you may get worse or more frequent headaches when the medicine wears off. · Do not ignore new symptoms that occur with a headache, such as a fever, weakness or numbness, vision changes, or confusion. These may be signs of a more serious problem. To prevent headaches · Keep a headache diary so you can figure out what triggers your headaches. Avoiding triggers may help you prevent headaches. Record when each headache began, how long it lasted, and what the pain was like (throbbing, aching, stabbing, or dull). Write down any other symptoms you had with the headache, such as nausea, flashing lights or dark spots, or sensitivity to bright light or loud noise. Note if the headache occurred near your period. List anything that might have triggered the headache, such as certain foods (chocolate, cheese, wine) or odors, smoke, bright light, stress, or lack of sleep. · Find healthy ways to deal with stress. Headaches are most common during or right after stressful times. Take time to relax before and after you do something that has caused a headache in the past. 
· Try to keep your muscles relaxed by keeping good posture. Check your jaw, face, neck, and shoulder muscles for tension, and try relaxing them. When sitting at a desk, change positions often, and stretch for 30 seconds each hour. · Get plenty of sleep and exercise. · Eat regularly and well. Long periods without food can trigger a headache. · Treat yourself to a massage. Some people find that regular massages are very helpful in relieving tension. · Limit caffeine by not drinking too much coffee, tea, or soda. But don't quit caffeine suddenly, because that can also give you headaches. · Reduce eyestrain from computers by blinking frequently and looking away from the computer screen every so often. Make sure you have proper eyewear and that your monitor is set up properly, about an arm's length away. · Seek help if you have depression or anxiety. Your headaches may be linked to these conditions. Treatment can both prevent headaches and help with symptoms of anxiety or depression. When should you call for help? Call 911 anytime you think you may need emergency care. For example, call if: 
? · You have signs of a stroke. These may include: 
¨ Sudden numbness, paralysis, or weakness in your face, arm, or leg, especially on only one side of your body. ¨ Sudden vision changes. ¨ Sudden trouble speaking. ¨ Sudden confusion or trouble understanding simple statements. ¨ Sudden problems with walking or balance. ¨ A sudden, severe headache that is different from past headaches. ?Call your doctor now or seek immediate medical care if: 
? · You have a new or worse headache. ? · Your headache gets much worse. Where can you learn more? Go to http://placido-willi.info/. Enter M271 in the search box to learn more about \"Headache: Care Instructions. \" Current as of: October 14, 2016 Content Version: 11.4 © 4078-5342 Healthwise, [employer]. Care instructions adapted under license by [employer] (which disclaims liability or warranty for this information). If you have questions about a medical condition or this instruction, always ask your healthcare professional. [address] any warranty or liability for your use of this information. Introducing [facility]! Olga Lidia Garrett introduces Recommendo patient portal. Now you can access parts of your medical record, email your doctor's office, and request medication refills online. 1. In your internet browser, go to [email or web address] 2. Click on the First Time User? Click Here link in the Sign In box. You will see the New Member Sign Up page. 3. Enter your Recommendo Access Code exactly as it appears below. You will not need to use this code after youve completed the sign-up process. If you do not sign up before the expiration date, you must request a new code. · Recommendo Access Code: [email or web address] Expires: 6/12/2018  2:25 PM 
 
4. Enter the last four digits of your Social Security Number (xxxx) and Date of Birth (mm/dd/yyyy) as indicated and click Submit. You will be taken to the next sign-up page. 5. Create a Recommendo ID. This will be your Recommendo login ID and cannot be changed, so think of one that is secure and easy to remember. 6. Create a Recommendo password. You can change your password at any time. 7. Enter your Password Reset Question and Answer. This can be used at a later time if you forget your password. 8. Enter your e-mail address. You will receive e-mail notification when new information is available in [address] 9. Click Sign Up. You can now view and download portions of your medical record. 10. Click the Download Summary menu link to download a portable copy of your medical information. If you have questions, please visit the Frequently Asked Questions section of the [facility] website. Remember, [facility] is NOT to be used for urgent needs. For medical emergencies, dial 911. Now available from your iPhone and Android! Please provide this summary of care documentation to your next provider. Your primary care clinician is listed as [address]. If you have any questions after today's visit, please call [phone].

## 2018-03-14 NOTE — PATIENT INSTRUCTIONS

## 2018-03-14 NOTE — PROGRESS NOTES
Lili Dave is a 29 y.o.  female and presents with    Chief Complaint   Patient presents with    Migraine     x1 week      Subjective:  Patient presents with complaint of continued intermittent headaches x several days. She states she has taken tylenol a couple times with success but that headache keeps coming back. She states these headaches are same qulaity as they have been in past and no major change to intensity. RATES PAIN 4/10. Was previously on Elavil with success daily but ran out of the medication and did not make f/u appt for medication refill. Current Outpatient Prescriptions   Medication Sig Dispense Refill    amitriptyline (ELAVIL) 25 mg tablet Take 1 Tab by mouth nightly. 30 Tab 2    albuterol (PROVENTIL VENTOLIN) 2.5 mg /3 mL (0.083 %) nebulizer solution 3 mL by Nebulization route every four (4) hours as needed. 24 Each 3    albuterol (PROVENTIL HFA, VENTOLIN HFA, PROAIR HFA) 90 mcg/actuation inhaler Take 2 Puffs by inhalation every four (4) hours as needed for Wheezing. 1 Inhaler 11    EPINEPHrine (EPIPEN) 0.3 mg/0.3 mL injection 0.3 mL by IntraMUSCular route once as needed. 1 Syringe 0    inhalational spacing device 1 Each by Does Not Apply route as needed. 1 Each 0    esomeprazole (NEXIUM) 40 mg capsule Take 1 Cap by mouth daily. 30 Cap 3     Allergies   Allergen Reactions    Nsaids (Non-Steroidal Anti-Inflammatory Drug) Nausea and Vomiting    Percocet [Oxycodone-Acetaminophen] Nausea and Vomiting    Tramadol Other (comments)     Abdominal pain       Review of Systems   Eyes: Negative for blurred vision and photophobia. Neurological: Positive for headaches. All other systems reviewed and are negative.        Objective:  Vitals:    03/14/18 1349   BP: 109/78   Pulse: 72   Resp: 17   Temp: 97.9 °F (36.6 °C)   TempSrc: Oral   SpO2: 100%   Weight: 206 lb (93.4 kg)   Height: 5' 4\" (1.626 m)   PainSc:   4   PainLoc: Head   LMP: 03/01/2018     Physical Exam   Constitutional: She appears well-developed and well-nourished. HENT:   Head: Normocephalic and atraumatic. Right Ear: External ear normal.   Left Ear: External ear normal.   Nose: Nose normal.   Mouth/Throat: Oropharynx is clear and moist.   Eyes: Conjunctivae and EOM are normal. Pupils are equal, round, and reactive to light. Cardiovascular: Normal rate, regular rhythm and normal heart sounds. Pulmonary/Chest: Effort normal and breath sounds normal.   Neurological:   Neuro intact, + headaches         Assessment/Plan:      1. Chronic tension-type headache, not intractable  Restart elavil, continue tylenol/excedrin as needed for break through pain. rtc 3 months for recheck sooner if needed. Discussed precautions and reassurance provided. If headaches worsen or change rtc or go to ED.   - amitriptyline (ELAVIL) 25 mg tablet; Take 1 Tab by mouth nightly. Dispense: 30 Tab; Refill: 2    I have discussed the diagnosis with the patient and the intended plan as seen in the above orders. The patient has received an after-visit summary and questions were answered concerning future plans. I have discussed medication side effects and warnings with the patient as well. I have reviewed the plan of care with the patient, accepted their input and they are in agreement with the treatment goals. Follow-up Disposition:  Return if symptoms worsen or fail to improve. More than 1/2 of this 30 minute visit was spent in counselling and coordination of care, as described above.     Alo Glass FNP-BC

## 2018-06-21 RX ORDER — ALBUTEROL SULFATE 0.83 MG/ML
2.5 SOLUTION RESPIRATORY (INHALATION)
Qty: 60 EACH | Refills: 0 | Status: SHIPPED | OUTPATIENT
Start: 2018-06-21 | End: 2019-12-13 | Stop reason: SDUPTHER

## 2018-09-06 ENCOUNTER — OFFICE VISIT (OUTPATIENT)
Dept: FAMILY MEDICINE CLINIC | Age: 33
End: 2018-09-06

## 2018-09-06 VITALS
WEIGHT: 204.4 LBS | RESPIRATION RATE: 18 BRPM | BODY MASS INDEX: 34.89 KG/M2 | DIASTOLIC BLOOD PRESSURE: 82 MMHG | OXYGEN SATURATION: 98 % | SYSTOLIC BLOOD PRESSURE: 120 MMHG | TEMPERATURE: 98.9 F | HEART RATE: 85 BPM | HEIGHT: 64 IN

## 2018-09-06 DIAGNOSIS — K27.9 PUD (PEPTIC ULCER DISEASE): ICD-10-CM

## 2018-09-06 DIAGNOSIS — Z23 ENCOUNTER FOR IMMUNIZATION: Primary | ICD-10-CM

## 2018-09-06 PROBLEM — E66.01 SEVERE OBESITY (BMI 35.0-39.9): Status: ACTIVE | Noted: 2018-09-06

## 2018-09-06 RX ORDER — ESOMEPRAZOLE MAGNESIUM 40 MG/1
40 CAPSULE, DELAYED RELEASE ORAL DAILY
Qty: 30 CAP | Refills: 0 | Status: SHIPPED | OUTPATIENT
Start: 2018-09-06 | End: 2021-02-16

## 2018-09-06 NOTE — MR AVS SNAPSHOT
Low Ramirez 
 
 
 [address] [facility] [id number] [phone] Patient: Reggie Barnhart MRN: [id number] Visit Information Date & Time Provider Department Dept. Phone Encounter #  
 9/6/2018  2:15 PM Farrah Argueta, Erik Reyes [place] [phone] [id number] Upcoming Health Maintenance Date Due Pneumococcal 19-64 Medium Risk (1 of 1 - PPSV23) 8/15/2006 Influenza Age 5 to Adult 8/1/2018 PAP AKA CERVICAL CYTOLOGY 7/24/2020 DTaP/Tdap/Td series (2 - Td) 7/11/2027 Allergies as of 9/6/2018  Review Complete On: 9/6/2018 By: Farrah Argueta MD  
  
 Severity Noted Reaction Type Reactions Nsaids (Non-steroidal Anti-inflammatory Drug)  02/05/2015    Nausea and Vomiting Percocet [Oxycodone-acetaminophen]  11/16/2015    Nausea and Vomiting Tramadol  03/15/2016    Other (comments) Abdominal pain Current Immunizations  Never Reviewed Name Date  
 TB Skin Test (PPD) Intradermal 7/11/2017  2:10 PM  
 Tdap 7/11/2017  2:27 PM  
  
 Not reviewed this visit You Were Diagnosed With   
  
 Codes Comments PUD (peptic ulcer disease)     ICD-10-CM: K27.9 ICD-9-CM: 533.90 Vitals BP Pulse Temp Resp Height(growth percentile) Weight(growth percentile) 120/82 85 98.9 °F (37.2 °C) (Oral) 18 5' 4\" (1.626 m) 204 lb 6.4 oz (92.7 kg) LMP SpO2 BMI OB Status Smoking Status 08/16/2018 98% 35.09 kg/m2 Having regular periods Never Smoker Vitals History BMI and BSA Data Body Mass Index Body Surface Area 35.09 kg/m 2 2.05 m 2 Preferred Pharmacy Pharmacy Name Phone [address], [address] [facility] [phone] Your Updated Medication List  
  
   
This list is accurate as of 9/6/18  2:23 PM.  Always use your most recent med list.  
  
  
  
  
 * albuterol 90 mcg/actuation inhaler Commonly known as:  PROVENTIL HFA, VENTOLIN HFA, PROAIR HFA Take 2 Puffs by inhalation every four (4) hours as needed for Wheezing. * albuterol 2.5 mg /3 mL (0.083 %) nebulizer solution Commonly known as:  PROVENTIL VENTOLIN  
3 mL by Nebulization route every four (4) hours as needed for Wheezing. amitriptyline 25 mg tablet Commonly known as:  ELAVIL Take 1 Tab by mouth nightly. EPINEPHrine 0.3 mg/0.3 mL injection Commonly known as:  EPIPEN  
0.3 mL by IntraMUSCular route once as needed. esomeprazole 40 mg capsule Commonly known as:  NexIUM Take 1 Cap by mouth daily. inhalational spacing device 1 Each by Does Not Apply route as needed. * Notice: This list has 2 medication(s) that are the same as other medications prescribed for you. Read the directions carefully, and ask your doctor or other care provider to review them with you. Prescriptions Sent to Pharmacy Refills  
 esomeprazole (NEXIUM) 40 mg capsule 0 Sig: Take 1 Cap by mouth daily. Class: Normal  
 Pharmacy: [address] [place] [address] #: [phone] Route: Oral  
  
We Performed the Following REFERRAL TO GASTROENTEROLOGY [FWU05 Custom] To-Do List   
 09/06/2018 Lab:  CBC WITH AUTOMATED DIFF   
  
 09/06/2018 Lab:  METABOLIC PANEL, BASIC Referral Information Referral ID Referred By Referred To  
  
 [id number] [facility] [address] Jenn Rykert, [address] [facility] Phone: [phone] Fax: [phone] Visits Status Start Date End Date 1 New Request 9/6/18 9/6/19 If your referral has a status of pending review or denied, additional information will be sent to support the outcome of this decision. Patient Instructions Peptic Ulcer Disease: Care Instructions Your Care Instructions Peptic ulcers are sores on the inside of the stomach or the small intestine (such as a duodenal ulcer). They are most often caused by an infection with bacteria or from use of nonsteroidal anti-inflammatory drugs (NSAIDs). NSAIDs include aspirin, ibuprofen (Advil), and naproxen (Aleve). Your doctor may have prescribed medicine to reduce stomach acid. You also may need to take antibiotics if your peptic ulcers are caused by an infection. You can help yourself heal and keep ulcers from coming back by making some changes in your lifestyle. Quit smoking. Limit alcohol. Follow-up care is a key part of your treatment and safety. Be sure to make and go to all appointments, and call your doctor if you are having problems. It's also a good idea to know your test results and keep a list of the medicines you take. How can you care for yourself at home? · Be safe with medicines. Take your medicines exactly as prescribed. Call your doctor if you think you are having a problem with your medicine. · Do not take aspirin or other NSAIDs such as ibuprofen (Advil or Motrin) or naproxen (Aleve). Ask your doctor what you can take for pain. · Do not smoke. Smoking can make ulcers worse. If you need help quitting, talk to your doctor about stop-smoking programs and medicines. These can increase your chances of quitting for good. · Drink in moderation or avoid drinking alcohol. · Eat a balanced diet of small, frequent meals. See a dietitian if you need help planning your meals. When should you call for help? [id number] anytime you think you may need emergency care. For example, call if: 
  · You vomit blood or what looks like coffee grounds.  
  · You pass maroon or very bloody stools.  
 Call your doctor now or seek immediate medical care if: 
  · You have new or worse belly pain.  
  · Your stools are black and look like tar, or they have streaks of blood.  
  · You vomit.  Watch closely for changes in your health, and be sure to contact your doctor if: 
  · You do not get better as expected. Where can you learn more? Go to http://placido-willi.info/. Enter I966 in the search box to learn more about \"Peptic Ulcer Disease: Care Instructions. \" Current as of: July 24, 2017 Content Version: 11.7 © 6156-2501 [employer]. Care instructions adapted under license by [employer] (which disclaims liability or warranty for this information). If you have questions about a medical condition or this instruction, always ask your healthcare professional. [place] [address] any warranty or liability for your use of this information. Introducing [facility]! Tiffany Olivarez introduces Sand Sign patient portal. Now you can access parts of your medical record, email your doctor's office, and request medication refills online. 1. In your internet browser, go to [email or web address] 2. Click on the First Time User? Click Here link in the Sign In box. You will see the New Member Sign Up page. 3. Enter your Sand Sign Access Code exactly as it appears below. You will not need to use this code after youve completed the sign-up process. If you do not sign up before the expiration date, you must request a new code. · Sand Sign Access Code: [email or web address] Expires: 12/5/2018  2:23 PM 
 
4. Enter the last four digits of your Social Security Number (xxxx) and Date of Birth (mm/dd/yyyy) as indicated and click Submit. You will be taken to the next sign-up page. 5. Create a HotDeskt ID. This will be your Sand Sign login ID and cannot be changed, so think of one that is secure and easy to remember. 6. Create a Sand Sign password. You can change your password at any time. 7. Enter your Password Reset Question and Answer. This can be used at a later time if you forget your password. 8. Enter your e-mail address. You will receive e-mail notification when new information is available in [address] 9. Click Sign Up. You can now view and download portions of your medical record. 10. Click the Download Summary menu link to download a portable copy of your medical information. If you have questions, please visit the Frequently Asked Questions section of the Sequenta website. Remember, Sequenta is NOT to be used for urgent needs. For medical emergencies, dial 911. Now available from your iPhone and Android! Please provide this summary of care documentation to your next provider. Your primary care clinician is listed as Robe 13. If you have any questions after today's visit, please call [phone].

## 2018-09-06 NOTE — PATIENT INSTRUCTIONS
Peptic Ulcer Disease: Care Instructions Your Care Instructions Peptic ulcers are sores on the inside of the stomach or the small intestine (such as a duodenal ulcer). They are most often caused by an infection with bacteria or from use of nonsteroidal anti-inflammatory drugs (NSAIDs). NSAIDs include aspirin, ibuprofen (Advil), and naproxen (Aleve). Your doctor may have prescribed medicine to reduce stomach acid. You also may need to take antibiotics if your peptic ulcers are caused by an infection. You can help yourself heal and keep ulcers from coming back by making some changes in your lifestyle. Quit smoking. Limit alcohol. Follow-up care is a key part of your treatment and safety. Be sure to make and go to all appointments, and call your doctor if you are having problems. It's also a good idea to know your test results and keep a list of the medicines you take. How can you care for yourself at home? · Be safe with medicines. Take your medicines exactly as prescribed. Call your doctor if you think you are having a problem with your medicine. · Do not take aspirin or other NSAIDs such as ibuprofen (Advil or Motrin) or naproxen (Aleve). Ask your doctor what you can take for pain. · Do not smoke. Smoking can make ulcers worse. If you need help quitting, talk to your doctor about stop-smoking programs and medicines. These can increase your chances of quitting for good. · Drink in moderation or avoid drinking alcohol. · Eat a balanced diet of small, frequent meals. See a dietitian if you need help planning your meals. When should you call for help? [id number] anytime you think you may need emergency care. For example, call if: 
  · You vomit blood or what looks like coffee grounds.  
  · You pass maroon or very bloody stools.  
 Call your doctor now or seek immediate medical care if: 
  · You have new or worse belly pain.  
  · Your stools are black and look like tar, or they have streaks of blood.   · You vomit.  
 Watch closely for changes in your health, and be sure to contact your doctor if: 
  · You do not get better as expected. Where can you learn more? Go to http://placido-willi.info/. Enter Z066 in the search box to learn more about \"Peptic Ulcer Disease: Care Instructions. \" Current as of: July 24, 2017 Content Version: 11.7 © 6993-2820 [employer]JIN. Care instructions adapted under license by [employer]il [employer] (which disclaims liability or warranty for this information). If you have questions about a medical condition or this instruction, always ask your healthcare professional. [address] any warranty or liability for your use of this information.

## 2018-09-06 NOTE — PROGRESS NOTES
Katina Overton is a 34 y.o. female presents in office to Chief Complaint Patient presents with  Abdominal Pain Pt complains of sharp pain that she felt for the past two days. Pt vomited blood today. Health Maintenance Due Topic Date Due  Pneumococcal 19-64 Medium Risk (1 of 1 - PPSV23) 08/15/2006  Influenza Age 5 to Adult  08/01/2018 1. Have you been to the ER, urgent care clinic since your last visit? Hospitalized since your last visit? No 
 
2. Have you seen or consulted any other health care providers outside of the [facility] since your last visit? Include any pap smears or colon screening. No 
 
Do you have an [address] [facility]? No and received information Visit Vitals  /82  Pulse 85  Temp 98.9 °F (37.2 °C) (Oral)  Resp 18  Ht 5' 4\" (1.626 m)  Wt 204 lb 6.4 oz (92.7 kg)  SpO2 98%  BMI 35.09 kg/m2 Flu Immunization/s administered 9/6/2018 by Kenny Young in left deltoid. Patient tolerated procedure well. No reactions noted.

## 2018-09-06 NOTE — PROGRESS NOTES
Assessment/Plan: *Diagnoses and all orders for this visit: 1. Encounter for immunization -     Influenza virus vaccine (QUADRIVALENT PRES FREE SYRINGE) IM (83541) -     ID IMMUNIZ ADMIN,1 SINGLE/COMB VAC/TOXOID 2. PUD (peptic ulcer disease) -     esomeprazole (NEXIUM) 40 mg capsule; Take 1 Cap by mouth daily. 
-     REFERRAL TO GASTROENTEROLOGY 
-     CBC WITH AUTOMATED DIFF; Future -     METABOLIC PANEL, BASIC; Future Pt needs to see GI and get an EGD. Note given to her to return to work on Sunday, she does not want to miss anymore days after this. The plan was discussed with the patient. The patient verbalized understanding and is in agreement with the plan. All medication potential side effects were discussed with the patient. 
 
------------------------------------------------------------------------------------------------------------------- Brendon Guevara is a 34 y.o. female and presents with Abdominal Pain (Pt complains of sharp pain that she felt for the past two days. Pt vomited blood today.) Subjective: 
Pt has been having abd pain, can not tolerate any food x 2 days. She had an episode of vomiting blood earlier today, while at work. Was sent home from work. She has been having milder abd pain for the last 1 month leading up to this episode. ROS:to Review of Systems - Negative except abd pain. The problem list was updated as a part of today's visit. Patient Active Problem List  
Diagnosis Code  Asthma J45.909  PUD (peptic ulcer disease) K27.9  Chronic abdominal pain R10.9, G89.29  Chronic constipation K59.09  
 Chronic tension-type headache, not intractable G44.229  
 Severe obesity (BMI 35.0-39.9) ([facility]) E66.01 The PSH, FH were reviewed. SH: Social History Substance Use Topics  Smoking status: Never Smoker  Smokeless tobacco: Never Used  Alcohol use Yes Medications/Allergies: Current Outpatient Prescriptions on File Prior to Visit Medication Sig Dispense Refill  albuterol (PROVENTIL VENTOLIN) 2.5 mg /3 mL (0.083 %) nebulizer solution 3 mL by Nebulization route every four (4) hours as needed for Wheezing. 60 Each 0  
 amitriptyline (ELAVIL) 25 mg tablet Take 1 Tab by mouth nightly. 30 Tab 2  
 albuterol (PROVENTIL HFA, VENTOLIN HFA, PROAIR HFA) 90 mcg/actuation inhaler Take 2 Puffs by inhalation every four (4) hours as needed for Wheezing. 1 Inhaler 11  
 EPINEPHrine (EPIPEN) 0.3 mg/0.3 mL injection 0.3 mL by IntraMUSCular route once as needed. 1 Syringe 0  
 inhalational spacing device 1 Each by Does Not Apply route as needed. 1 Each 0 No current facility-administered medications on file prior to visit. Allergies Allergen Reactions  Nsaids (Non-Steroidal Anti-Inflammatory Drug) Nausea and Vomiting  Percocet [Oxycodone-Acetaminophen] Nausea and Vomiting  Tramadol Other (comments) Abdominal pain Health Maintenance:  
Health Maintenance Topic Date Due  Pneumococcal 19-64 Medium Risk (1 of 1 - PPSV23) 08/15/2006  Influenza Age 5 to Adult  08/01/2018  PAP AKA CERVICAL CYTOLOGY  07/24/2020  
 DTaP/Tdap/Td series (2 - Td) 07/11/2027 Objective: 
Visit Vitals  /82  Pulse 85  Temp 98.9 °F (37.2 °C) (Oral)  Resp 18  Ht 5' 4\" (1.626 m)  Wt 204 lb 6.4 oz (92.7 kg)  LMP 08/16/2018  SpO2 98%  BMI 35.09 kg/m2 Nurses notes and VS reviewed. Physical Examination: General appearance - alert, well appearing, and in no distress Chest - clear to auscultation, no wheezes, rales or rhonchi, symmetric air entry Heart - normal rate, regular rhythm, normal S1, S2, no murmurs, rubs, clicks or gallops Abdomen - tenderness noted epigastric region and to the R and L. Labwork and Ancillary Studies: CBC w/Diff Lab Results Component Value Date/Time  WBC 5.3 10/26/2017 08:18 AM  
 HGB 12.9 10/26/2017 08:18 AM  
 PLATELET 198 74/30/7607 08:18 AM  
  
 
 Basic Metabolic Profile Lab Results Component Value Date/Time Sodium 140 10/26/2017 08:18 AM  
 Potassium 4.4 10/26/2017 08:18 AM  
 Chloride 101 10/26/2017 08:18 AM  
 CO2 24 10/26/2017 08:18 AM  
 Anion gap 7 10/01/2014 09:25 PM  
 Glucose 106 (H) 10/26/2017 08:18 AM  
 BUN 8 10/26/2017 08:18 AM  
 Creatinine 0.82 10/26/2017 08:18 AM  
 BUN/Creatinine ratio 10 10/26/2017 08:18 AM  
 GFR est AA CANCELED 10/26/2017 08:18 AM  
 GFR est non-AA CANCELED 10/26/2017 08:18 AM  
 Calcium 9.4 10/26/2017 08:18 AM  
  
  
LFT Lab Results Component Value Date/Time ALT (SGPT) 14 10/26/2017 08:18 AM  
 AST (SGOT) 14 10/26/2017 08:18 AM  
 Alk. phosphatase 46 10/26/2017 08:18 AM  
 Bilirubin, total 0.2 10/26/2017 08:18 AM  
 
 
 
Cholesterol No results found for: CHOL, CHOLX, CHLST, CHOLV, HDL, LDL, LDLC, DLDLP, TGLX, TRIGL, TRIGP, CHHD, CHHDX

## 2018-09-06 NOTE — LETTER
NOTIFICATION RETURN TO WORK / SCHOOL 
 
9/6/2018 2:27 PM 
 
Ms. Aditya Rossi [address] [place] [phone] To Whom It May Concern: 
 
Dayana Sosa is currently under the care of [address] [place] [address]. She was seen for a visit today for illness. She will return to work on: 9/9/18. If there are questions or concerns please have the patient contact our office. Sincerely, Jhony Nichols MD

## 2018-09-07 LAB
ABSOLUTE LYMPHOCYTE COUNT, 10803: 2.7 K/UL (ref 1–4.8)
ANION GAP SERPL CALC-SCNC: 14 MMOL/L
BASOPHILS # BLD: 0 K/UL (ref 0–0.2)
BASOPHILS NFR BLD: 0 % (ref 0–2)
BUN SERPL-MCNC: 11 MG/DL (ref 6–22)
CALCIUM SERPL-MCNC: 9.3 MG/DL (ref 8.4–10.5)
CHLORIDE SERPL-SCNC: 100 MMOL/L (ref 98–110)
CO2 SERPL-SCNC: 26 MMOL/L (ref 20–32)
CREAT SERPL-MCNC: 0.8 MG/DL (ref 0.5–1.2)
EOSINOPHIL # BLD: 0.1 K/UL (ref 0–0.5)
EOSINOPHIL NFR BLD: 1 % (ref 0–6)
ERYTHROCYTE [DISTWIDTH] IN BLOOD BY AUTOMATED COUNT: 13.5 % (ref 10–15.5)
GFRAA, 66117: >60
GFRNA, 66118: >60
GLUCOSE SERPL-MCNC: 93 MG/DL (ref 70–99)
GRANULOCYTES,GRANS: 47 % (ref 40–75)
HCT VFR BLD AUTO: 39.2 % (ref 35.1–46.5)
HGB BLD-MCNC: 12.7 G/DL (ref 11.7–15.5)
LYMPHOCYTES, LYMLT: 45 % (ref 20–45)
MCH RBC QN AUTO: 28 PG (ref 26–34)
MCHC RBC AUTO-ENTMCNC: 32 G/DL (ref 31–36)
MCV RBC AUTO: 87 FL (ref 80–95)
MONOCYTES # BLD: 0.4 K/UL (ref 0.1–1)
MONOCYTES NFR BLD: 7 % (ref 3–12)
NEUTROPHILS # BLD AUTO: 2.8 K/UL (ref 1.8–7.7)
PLATELET # BLD AUTO: 347 K/UL (ref 140–440)
PMV BLD AUTO: 10.9 FL (ref 9–13)
POTASSIUM SERPL-SCNC: 4.6 MMOL/L (ref 3.5–5.5)
RBC # BLD AUTO: 4.51 M/UL (ref 3.8–5.2)
SODIUM SERPL-SCNC: 140 MMOL/L (ref 133–145)
WBC # BLD AUTO: 5.9 K/UL (ref 4–11)

## 2018-10-04 ENCOUNTER — APPOINTMENT (OUTPATIENT)
Dept: GENERAL RADIOLOGY | Age: 33
End: 2018-10-04
Attending: PHYSICIAN ASSISTANT
Payer: COMMERCIAL

## 2018-10-04 ENCOUNTER — HOSPITAL ENCOUNTER (EMERGENCY)
Age: 33
Discharge: HOME | End: 2018-10-04
Attending: EMERGENCY MEDICINE
Payer: COMMERCIAL

## 2018-10-04 VITALS
SYSTOLIC BLOOD PRESSURE: 138 MMHG | DIASTOLIC BLOOD PRESSURE: 84 MMHG | OXYGEN SATURATION: 100 % | BODY MASS INDEX: 33.12 KG/M2 | HEART RATE: 77 BPM | HEIGHT: 64 IN | TEMPERATURE: 99.1 F | RESPIRATION RATE: 16 BRPM | WEIGHT: 194 LBS

## 2018-10-04 DIAGNOSIS — R11.2 NON-INTRACTABLE VOMITING WITH NAUSEA, UNSPECIFIED VOMITING TYPE: Primary | ICD-10-CM

## 2018-10-04 DIAGNOSIS — K27.9 PUD (PEPTIC ULCER DISEASE): ICD-10-CM

## 2018-10-04 LAB
ALBUMIN SERPL-MCNC: 4 G/DL (ref 3.4–5)
ALBUMIN/GLOB SERPL: 1.2 {RATIO} (ref 0.8–1.7)
ALP SERPL-CCNC: 47 U/L (ref 45–117)
ALT SERPL-CCNC: 27 U/L (ref 13–56)
ANION GAP SERPL CALC-SCNC: 5 MMOL/L (ref 3–18)
APPEARANCE UR: CLEAR
AST SERPL-CCNC: 14 U/L (ref 15–37)
BASOPHILS # BLD: 0 K/UL (ref 0–0.1)
BASOPHILS NFR BLD: 0 % (ref 0–2)
BILIRUB SERPL-MCNC: 0.3 MG/DL (ref 0.2–1)
BILIRUB UR QL: NEGATIVE
BUN SERPL-MCNC: 10 MG/DL (ref 7–18)
BUN/CREAT SERPL: 10 (ref 12–20)
CALCIUM SERPL-MCNC: 9.1 MG/DL (ref 8.5–10.1)
CHLORIDE SERPL-SCNC: 104 MMOL/L (ref 100–108)
CO2 SERPL-SCNC: 29 MMOL/L (ref 21–32)
COLOR UR: YELLOW
CREAT SERPL-MCNC: 0.98 MG/DL (ref 0.6–1.3)
DIFFERENTIAL METHOD BLD: NORMAL
EOSINOPHIL # BLD: 0.1 K/UL (ref 0–0.4)
EOSINOPHIL NFR BLD: 1 % (ref 0–5)
ERYTHROCYTE [DISTWIDTH] IN BLOOD BY AUTOMATED COUNT: 13.1 % (ref 11.6–14.5)
GLOBULIN SER CALC-MCNC: 3.3 G/DL (ref 2–4)
GLUCOSE SERPL-MCNC: 102 MG/DL (ref 74–99)
GLUCOSE UR STRIP.AUTO-MCNC: NEGATIVE MG/DL
HCG UR QL: NEGATIVE
HCT VFR BLD AUTO: 39.2 % (ref 35–45)
HGB BLD-MCNC: 12.8 G/DL (ref 12–16)
HGB UR QL STRIP: NEGATIVE
KETONES UR QL STRIP.AUTO: NEGATIVE MG/DL
LEUKOCYTE ESTERASE UR QL STRIP.AUTO: NEGATIVE
LIPASE SERPL-CCNC: 155 U/L (ref 73–393)
LYMPHOCYTES # BLD: 2.6 K/UL (ref 0.9–3.6)
LYMPHOCYTES NFR BLD: 39 % (ref 21–52)
MCH RBC QN AUTO: 27.9 PG (ref 24–34)
MCHC RBC AUTO-ENTMCNC: 32.7 G/DL (ref 31–37)
MCV RBC AUTO: 85.6 FL (ref 74–97)
MONOCYTES # BLD: 0.6 K/UL (ref 0.05–1.2)
MONOCYTES NFR BLD: 9 % (ref 3–10)
NEUTS SEG # BLD: 3.4 K/UL (ref 1.8–8)
NEUTS SEG NFR BLD: 51 % (ref 40–73)
NITRITE UR QL STRIP.AUTO: NEGATIVE
PH UR STRIP: 7 [PH] (ref 5–8)
PLATELET # BLD AUTO: 262 K/UL (ref 135–420)
PMV BLD AUTO: 10.5 FL (ref 9.2–11.8)
POTASSIUM SERPL-SCNC: 4 MMOL/L (ref 3.5–5.5)
PROT SERPL-MCNC: 7.3 G/DL (ref 6.4–8.2)
PROT UR STRIP-MCNC: NEGATIVE MG/DL
RBC # BLD AUTO: 4.58 M/UL (ref 4.2–5.3)
SODIUM SERPL-SCNC: 138 MMOL/L (ref 136–145)
SP GR UR REFRACTOMETRY: 1.02 (ref 1–1.03)
UROBILINOGEN UR QL STRIP.AUTO: 1 EU/DL (ref 0.2–1)
WBC # BLD AUTO: 6.7 K/UL (ref 4.6–13.2)

## 2018-10-04 PROCEDURE — 80053 COMPREHEN METABOLIC PANEL: CPT | Performed by: PHYSICIAN ASSISTANT

## 2018-10-04 PROCEDURE — 74022 RADEX COMPL AQT ABD SERIES: CPT

## 2018-10-04 PROCEDURE — 96361 HYDRATE IV INFUSION ADD-ON: CPT

## 2018-10-04 PROCEDURE — 96374 THER/PROPH/DIAG INJ IV PUSH: CPT

## 2018-10-04 PROCEDURE — 81025 URINE PREGNANCY TEST: CPT | Performed by: PHYSICIAN ASSISTANT

## 2018-10-04 PROCEDURE — 83690 ASSAY OF LIPASE: CPT | Performed by: PHYSICIAN ASSISTANT

## 2018-10-04 PROCEDURE — 81003 URINALYSIS AUTO W/O SCOPE: CPT | Performed by: PHYSICIAN ASSISTANT

## 2018-10-04 PROCEDURE — 85025 COMPLETE CBC W/AUTO DIFF WBC: CPT | Performed by: PHYSICIAN ASSISTANT

## 2018-10-04 PROCEDURE — 74011250637 HC RX REV CODE- 250/637: Performed by: PHYSICIAN ASSISTANT

## 2018-10-04 PROCEDURE — 96375 TX/PRO/DX INJ NEW DRUG ADDON: CPT

## 2018-10-04 PROCEDURE — 74011250636 HC RX REV CODE- 250/636: Performed by: PHYSICIAN ASSISTANT

## 2018-10-04 PROCEDURE — 99283 EMERGENCY DEPT VISIT LOW MDM: CPT

## 2018-10-04 RX ORDER — POLYETHYLENE GLYCOL 3350 17 G/17G
17 POWDER, FOR SOLUTION ORAL DAILY
Qty: 100 G | Refills: 0 | Status: SHIPPED | OUTPATIENT
Start: 2018-10-04 | End: 2021-03-18

## 2018-10-04 RX ORDER — MAG HYDROX/ALUMINUM HYD/SIMETH 200-200-20
30 SUSPENSION, ORAL (FINAL DOSE FORM) ORAL
Qty: 1 BOTTLE | Refills: 0 | Status: SHIPPED | OUTPATIENT
Start: 2018-10-04 | End: 2021-03-18

## 2018-10-04 RX ORDER — PANTOPRAZOLE SODIUM 40 MG/1
40 TABLET, DELAYED RELEASE ORAL DAILY
COMMUNITY
End: 2018-11-14 | Stop reason: ALTCHOICE

## 2018-10-04 RX ORDER — ONDANSETRON 4 MG/1
4 TABLET, FILM COATED ORAL
Qty: 12 TAB | Refills: 0 | Status: SHIPPED | OUTPATIENT
Start: 2018-10-04 | End: 2019-07-11 | Stop reason: ALTCHOICE

## 2018-10-04 RX ORDER — FAMOTIDINE 10 MG/ML
20 INJECTION INTRAVENOUS
Status: COMPLETED | OUTPATIENT
Start: 2018-10-04 | End: 2018-10-04

## 2018-10-04 RX ORDER — ONDANSETRON 2 MG/ML
4 INJECTION INTRAMUSCULAR; INTRAVENOUS
Status: COMPLETED | OUTPATIENT
Start: 2018-10-04 | End: 2018-10-04

## 2018-10-04 RX ADMIN — FAMOTIDINE 20 MG: 10 INJECTION INTRAVENOUS at 21:57

## 2018-10-04 RX ADMIN — ONDANSETRON 4 MG: 2 INJECTION INTRAMUSCULAR; INTRAVENOUS at 21:57

## 2018-10-04 RX ADMIN — ALUMINUM HYDROXIDE AND MAGNESIUM HYDROXIDE 30 ML: 200; 200 SUSPENSION ORAL at 21:57

## 2018-10-04 RX ADMIN — SODIUM CHLORIDE 1000 ML: 900 INJECTION, SOLUTION INTRAVENOUS at 21:57

## 2018-10-04 NOTE — LETTER
[facility] EMERGENCY DEPT 
[address] [facility] [id number] [phone] Work/School Note Date: 10/4/2018 To Whom It May concern: 
 
Godfrey Sosa was seen and treated today in the emergency room by the following provider(s): 
Attending Provider: Marcela Solo MD 
Physician Assistant: Fabián Salas. Leah  may return to work on 10/6/18. Sincerely, 
 
 
 
 
Fabián Salas

## 2018-10-05 NOTE — ED TRIAGE NOTES
Patient states having an endoscopy with biospy performed today. She states noticing blood in vomitus today. C/o epigastric pain.

## 2018-10-05 NOTE — DISCHARGE INSTRUCTIONS
Nausea and Vomiting: Care Instructions  Your Care Instructions    When you are nauseated, you may feel weak and sweaty and notice a lot of saliva in your mouth. Nausea often leads to vomiting. Most of the time you do not need to worry about nausea and vomiting, but they can be signs of other illnesses. Two common causes of nausea and vomiting are stomach flu and food poisoning. Nausea and vomiting from viral stomach flu will usually start to improve within 24 hours. Nausea and vomiting from food poisoning may last from 12 to 48 hours. The doctor has checked you carefully, but problems can develop later. If you notice any problems or new symptoms, get medical treatment right away. Follow-up care is a key part of your treatment and safety. Be sure to make and go to all appointments, and call your doctor if you are having problems. It's also a good idea to know your test results and keep a list of the medicines you take. How can you care for yourself at home? · To prevent dehydration, drink plenty of fluids, enough so that your urine is light yellow or clear like water. Choose water and other caffeine-free clear liquids until you feel better. If you have kidney, heart, or liver disease and have to limit fluids, talk with your doctor before you increase the amount of fluids you drink. · Rest in bed until you feel better. · When you are able to eat, try clear soups, mild foods, and liquids until all symptoms are gone for 12 to 48 hours. Other good choices include dry toast, crackers, cooked cereal, and gelatin dessert, such as Jell-O. When should you call for help? Call 911 anytime you think you may need emergency care. For example, call if:    · You passed out (lost consciousness).    Call your doctor now or seek immediate medical care if:    · You have symptoms of dehydration, such as:  ¨ Dry eyes and a dry mouth. ¨ Passing only a little dark urine.   ¨ Feeling thirstier than usual.     · You have new or worsening belly pain.     · You have a new or higher fever.     · You vomit blood or what looks like coffee grounds.    Watch closely for changes in your health, and be sure to contact your doctor if:    · You have ongoing nausea and vomiting.     · Your vomiting is getting worse.     · Your vomiting lasts longer than 2 days.     · You are not getting better as expected. Where can you learn more? Go to http://placido-willi.info/. Enter 25 [id number] in the search box to learn more about \"Nausea and Vomiting: Care Instructions. \"  Current as of: November 20, 2017  Content Version: 11.8  © 9287-2121 [employer]. Care instructions adapted under license by [employer] (which disclaims liability or warranty for this information). If you have questions about a medical condition or this instruction, always ask your healthcare professional. [address] any warranty or liability for your use of this information. Peptic Ulcer Disease: Care Instructions  Your Care Instructions    Peptic ulcers are sores on the inside of the stomach or the small intestine (such as a duodenal ulcer). They are most often caused by an infection with bacteria or from use of nonsteroidal anti-inflammatory drugs (NSAIDs). NSAIDs include aspirin, ibuprofen (Advil), and naproxen (Aleve). Your doctor may have prescribed medicine to reduce stomach acid. You also may need to take antibiotics if your peptic ulcers are caused by an infection. You can help yourself heal and keep ulcers from coming back by making some changes in your lifestyle. Quit smoking. Limit alcohol. Follow-up care is a key part of your treatment and safety. Be sure to make and go to all appointments, and call your doctor if you are having problems. It's also a good idea to know your test results and keep a list of the medicines you take. How can you care for yourself at home? · Be safe with medicines.  Take your medicines exactly as prescribed. Call your doctor if you think you are having a problem with your medicine. · Do not take aspirin or other NSAIDs such as ibuprofen (Advil or Motrin) or naproxen (Aleve). Ask your doctor what you can take for pain. · Do not smoke. Smoking can make ulcers worse. If you need help quitting, talk to your doctor about stop-smoking programs and medicines. These can increase your chances of quitting for good. · Drink in moderation or avoid drinking alcohol. · Eat a balanced diet of small, frequent meals. See a dietitian if you need help planning your meals. When should you call for help? [id number] anytime you think you may need emergency care. For example, call if:    · You vomit blood or what looks like coffee grounds.     · You pass maroon or very bloody stools.    Call your doctor now or seek immediate medical care if:    · You have new or worse belly pain.     · Your stools are black and look like tar, or they have streaks of blood.     · You vomit.    Watch closely for changes in your health, and be sure to contact your doctor if:    · You do not get better as expected. Where can you learn more? Go to http://placido-willi.info/. Enter F120 in the search box to learn more about \"Peptic Ulcer Disease: Care Instructions. \"  Current as of: July 24, 2017  Content Version: 11.8  © 6570-6233 [employer]. Care instructions adapted under license by [employer] (which disclaims liability or warranty for this information). If you have questions about a medical condition or this instruction, always ask your healthcare professional. [place] [address] any warranty or liability for your use of this information. [employer] Activation    Thank you for requesting access to [employer]. Please follow the instructions below to securely access and download your online medical record.  [employer] allows you to send messages to your doctor, view your test results, renew your prescriptions, schedule appointments, and more. How Do I Sign Up? 1. In your internet browser, go to [email or web address] EDI  2. Click on the First Time User? Click Here link in the Sign In box. You will be redirect to the New Member Sign Up page. 3. Enter your [employer] Access Code exactly as it appears below. You will not need to use this code after youve completed the sign-up process. If you do not sign up before the expiration date, you must request a new code. [employer] Access Code: [email or web address]  Expires: 2018  2:23 PM (This is the date your [employer] access code will )    4. Enter the last four digits of your Social Security Number (xxxx) and Date of Birth (mm/dd/yyyy) as indicated and click Submit. You will be taken to the next sign-up page. 5. Create a [employer] ID. This will be your [employer] login ID and cannot be changed, so think of one that is secure and easy to remember. 6. Create a [employer] password. You can change your password at any time. 7. Enter your Password Reset Question and Answer. This can be used at a later time if you forget your password. 8. Enter your e-mail address. You will receive e-mail notification when new information is available in [address] 9. Click Sign Up. You can now view and download portions of your medical record. 10. Click the Download Summary menu link to download a portable copy of your medical information. Additional Information    If you have questions, please visit the Frequently Asked Questions section of the [employer] website at [email or web address] Remember, [employer] is NOT to be used for urgent needs. For medical emergencies, dial 911.

## 2018-10-05 NOTE — ED PROVIDER NOTES
EMERGENCY DEPARTMENT HISTORY AND PHYSICAL EXAM 
 
9:19 PM 
 
 
Date: 10/4/2018 Patient Name: Evelyne Diaz History of Presenting Illness Chief Complaint Patient presents with  Epigastric Pain History Provided By: Patient Chief Complaint: epigastric abdominal pain, n/v Duration:  Weeks Timing:  Intermittent Location: epigastric Quality: Burning Severity: Moderate Modifying Factors: none Associated Symptoms: denies any other associated signs or symptoms Additional History (Context): Evelyne Diaz is a 34 y.o. female with hx of asthma, PUD who presents with c/o epigastric abdominal pain associated with n/v x 1 month. Pt notes the symptoms are intermittent. Notes \"I've been throwing up all day\", notes streaks of bright red blood in last episode of emesis today. Notes taking Protonix for ulcers. Notes endoscopy today, results negative per GI physician. Denies fever/chills, diarrhea, dysuria, hematuria, melena, coffee ground emesis. Denies hx of abdominal surgeries in the past.  
 
PCP: Francisco Nina MD 
 
Current Facility-Administered Medications Medication Dose Route Frequency Provider Last Rate Last Dose  sodium chloride 0.9 % bolus infusion 1,000 mL  1,000 mL IntraVENous ONCE Sanaz Dubon 1,000 mL/hr at 10/04/18 2157 1,000 mL at 10/04/18 2157 Current Outpatient Prescriptions Medication Sig Dispense Refill  pantoprazole (PROTONIX) 40 mg tablet Take 40 mg by mouth daily.  ondansetron hcl (ZOFRAN) 4 mg tablet Take 1 Tab by mouth every eight (8) hours as needed for Nausea. 12 Tab 0  
 alum-mag hydroxide-simeth (MYLANTA) 200-200-20 mg/5 mL susp Take 30 mL by mouth three (3) times daily (with meals). 1 Bottle 0  
 polyethylene glycol (MIRALAX) 17 gram/dose powder Take 17 g by mouth daily. 1 tablespoon with 8 oz of water daily 100 g 0  
 esomeprazole (NEXIUM) 40 mg capsule Take 1 Cap by mouth daily.  30 Cap 0  
  albuterol (PROVENTIL VENTOLIN) 2.5 mg /3 mL (0.083 %) nebulizer solution 3 mL by Nebulization route every four (4) hours as needed for Wheezing. 60 Each 0  
 amitriptyline (ELAVIL) 25 mg tablet Take 1 Tab by mouth nightly. 30 Tab 2  
 albuterol (PROVENTIL HFA, VENTOLIN HFA, PROAIR HFA) 90 mcg/actuation inhaler Take 2 Puffs by inhalation every four (4) hours as needed for Wheezing. 1 Inhaler 11  
 EPINEPHrine (EPIPEN) 0.3 mg/0.3 mL injection 0.3 mL by IntraMUSCular route once as needed. 1 Syringe 0  
 inhalational spacing device 1 Each by Does Not Apply route as needed. 1 Each 0 Past History Past Medical History: 
Past Medical History:  
Diagnosis Date  Asthma  Gastrointestinal disorder   
 ulcers  Ovarian cyst   
 Ulcer Past Surgical History: 
Past Surgical History:  
Procedure Laterality Date  HX ORTHOPAEDIC    
 bilateral knee surgery Family History: 
Family History Problem Relation Age of Onset  Diabetes Maternal Grandmother  Cancer Neg Hx   
 Heart Disease Neg Hx  Hypertension Neg Hx  Stroke Neg Hx Social History: 
Social History Substance Use Topics  Smoking status: Never Smoker  Smokeless tobacco: Never Used  Alcohol use Yes Allergies: Allergies Allergen Reactions  Nsaids (Non-Steroidal Anti-Inflammatory Drug) Nausea and Vomiting  Percocet [Oxycodone-Acetaminophen] Nausea and Vomiting  Tramadol Other (comments) Abdominal pain Review of Systems Review of Systems Constitutional: Negative for chills and fever. Respiratory: Negative for shortness of breath. Cardiovascular: Negative for chest pain. Gastrointestinal: Positive for abdominal pain, constipation, nausea and vomiting. Negative for anal bleeding and blood in stool. Skin: Negative for rash. Neurological: Negative for weakness. All other systems reviewed and are negative. Physical Exam  
 
Visit Vitals  /84 (BP 1 Location: Left arm, BP Patient Position: At rest)  Pulse 77  Temp 99.1 °F (37.3 °C)  Resp 16  
 Ht 5' 4\" (1.626 m)  Wt 88 kg (194 lb)  LMP 09/13/2018  SpO2 100%  BMI 33.3 kg/m2 Physical Exam  
Constitutional: She appears well-developed and well-nourished. No distress. HENT:  
Head: Normocephalic and atraumatic. Neck: Normal range of motion. Neck supple. Cardiovascular: Normal rate, regular rhythm, normal heart sounds and intact distal pulses. Exam reveals no gallop and no friction rub. No murmur heard. Pulmonary/Chest: Effort normal and breath sounds normal. No respiratory distress. She has no wheezes. She has no rales. Abdominal: Soft. Bowel sounds are normal. She exhibits no distension and no mass. There is tenderness (mild epigastric). There is no rebound and no guarding. Musculoskeletal: Normal range of motion. Neurological: She is alert. Skin: Skin is warm. No rash noted. She is not diaphoretic. Nursing note and vitals reviewed. Diagnostic Study Results Labs - Recent Results (from the past 12 hour(s)) URINALYSIS W/ RFLX MICROSCOPIC Collection Time: 10/04/18  9:14 PM  
Result Value Ref Range Color YELLOW Appearance CLEAR Specific gravity 1.020 1.005 - 1.030    
 pH (UA) 7.0 5.0 - 8.0 Protein NEGATIVE  NEG mg/dL Glucose NEGATIVE  NEG mg/dL Ketone NEGATIVE  NEG mg/dL Bilirubin NEGATIVE  NEG Blood NEGATIVE  NEG Urobilinogen 1.0 0.2 - 1.0 EU/dL Nitrites NEGATIVE  NEG Leukocyte Esterase NEGATIVE  NEG    
HCG URINE, QL Collection Time: 10/04/18  9:14 PM  
Result Value Ref Range HCG urine, QL NEGATIVE  NEG    
CBC WITH AUTOMATED DIFF Collection Time: 10/04/18  9:33 PM  
Result Value Ref Range WBC 6.7 4.6 - 13.2 K/uL  
 RBC 4.58 4.20 - 5.30 M/uL  
 HGB 12.8 12.0 - 16.0 g/dL HCT 39.2 35.0 - 45.0 %  MCV 85.6 74.0 - 97.0 FL  
 MCH 27.9 24.0 - 34.0 PG  
 MCHC 32.7 31.0 - 37.0 g/dL  
 RDW 13.1 11.6 - 14.5 % PLATELET 330 938 - 315 K/uL MPV 10.5 9.2 - 11.8 FL  
 NEUTROPHILS 51 40 - 73 % LYMPHOCYTES 39 21 - 52 % MONOCYTES 9 3 - 10 % EOSINOPHILS 1 0 - 5 % BASOPHILS 0 0 - 2 %  
 ABS. NEUTROPHILS 3.4 1.8 - 8.0 K/UL  
 ABS. LYMPHOCYTES 2.6 0.9 - 3.6 K/UL  
 ABS. MONOCYTES 0.6 0.05 - 1.2 K/UL  
 ABS. EOSINOPHILS 0.1 0.0 - 0.4 K/UL  
 ABS. BASOPHILS 0.0 0.0 - 0.1 K/UL  
 DF AUTOMATED METABOLIC PANEL, COMPREHENSIVE Collection Time: 10/04/18  9:33 PM  
Result Value Ref Range Sodium 138 136 - 145 mmol/L Potassium 4.0 3.5 - 5.5 mmol/L Chloride 104 100 - 108 mmol/L  
 CO2 29 21 - 32 mmol/L Anion gap 5 3.0 - 18 mmol/L Glucose 102 (H) 74 - 99 mg/dL BUN 10 7.0 - 18 MG/DL Creatinine 0.98 0.6 - 1.3 MG/DL  
 BUN/Creatinine ratio 10 (L) 12 - 20 GFR est AA >60 >60 ml/min/1.73m2 GFR est non-AA >60 >60 ml/min/1.73m2 Calcium 9.1 8.5 - 10.1 MG/DL Bilirubin, total 0.3 0.2 - 1.0 MG/DL  
 ALT (SGPT) 27 13 - 56 U/L  
 AST (SGOT) 14 (L) 15 - 37 U/L Alk. phosphatase 47 45 - 117 U/L Protein, total 7.3 6.4 - 8.2 g/dL Albumin 4.0 3.4 - 5.0 g/dL Globulin 3.3 2.0 - 4.0 g/dL A-G Ratio 1.2 0.8 - 1.7 LIPASE Collection Time: 10/04/18  9:33 PM  
Result Value Ref Range Lipase 155 73 - 393 U/L Radiologic Studies -  
XR ABD ACUTE W 1 V CHEST    (Results Pending) RADIOLOGY FINDINGS Abd X-ray shows mild constipation Pending review by Radiologist 
Recorded by Michelle Prieto PA-C Medical Decision Making I am the first provider for this patient. I reviewed the vital signs, available nursing notes, past medical history, past surgical history, family history and social history. Vital Signs-Reviewed the patient's vital signs. Records Reviewed: Nursing Notes and Old Medical Records (Time of Review: 9:19 PM) ED Course: Progress Notes, Reevaluation, and Consults: PROGRESS NOTE: 
 10:05 PM: Pt notes she is already feeling better. Tolerating PO. Discussed need for close follow-up with GI physician and PMD. Discussed strict return precautions for any new, worsening, or persistent symptoms, including fever, vomiting, or any other medical concerns. Smiling, looks well. Pt and family member in agreement with plan. Diagnosis Clinical Impression: 1. Non-intractable vomiting with nausea, unspecified vomiting type 2. PUD (peptic ulcer disease) Disposition: home Follow-up Information Follow up With Details Comments Contact Info [id number] [facility] EMERGENCY DEPT  If symptoms worsen Mary Gutierrez [id number] [phone] Yoana Fisher MD In 2 days  [address] [facility] [employer] [address] [place] [id number] [phone] Patient's Medications Start Taking ALUM-MAG HYDROXIDE-SIMETH (MYLANTA) 200-200-20 MG/5 ML SUSP    Take 30 mL by mouth three (3) times daily (with meals). ONDANSETRON HCL (ZOFRAN) 4 MG TABLET    Take 1 Tab by mouth every eight (8) hours as needed for Nausea. POLYETHYLENE GLYCOL (MIRALAX) 17 GRAM/DOSE POWDER    Take 17 g by mouth daily. 1 tablespoon with 8 oz of water daily Continue Taking ALBUTEROL (PROVENTIL HFA, VENTOLIN HFA, PROAIR HFA) 90 MCG/ACTUATION INHALER    Take 2 Puffs by inhalation every four (4) hours as needed for Wheezing. ALBUTEROL (PROVENTIL VENTOLIN) 2.5 MG /3 ML (0.083 %) NEBULIZER SOLUTION    3 mL by Nebulization route every four (4) hours as needed for Wheezing. AMITRIPTYLINE (ELAVIL) 25 MG TABLET    Take 1 Tab by mouth nightly. EPINEPHRINE (EPIPEN) 0.3 MG/0.3 ML INJECTION    0.3 mL by IntraMUSCular route once as needed. ESOMEPRAZOLE (NEXIUM) 40 MG CAPSULE    Take 1 Cap by mouth daily. INHALATIONAL SPACING DEVICE    1 Each by Does Not Apply route as needed. PANTOPRAZOLE (PROTONIX) 40 MG TABLET    Take 40 mg by mouth daily. These Medications have changed No medications on file Stop Taking No medications on file

## 2018-11-14 ENCOUNTER — HOSPITAL ENCOUNTER (OUTPATIENT)
Dept: LAB | Age: 33
Discharge: HOME | End: 2018-11-14
Payer: COMMERCIAL

## 2018-11-14 ENCOUNTER — OFFICE VISIT (OUTPATIENT)
Dept: INTERNAL MEDICINE CLINIC | Age: 33
End: 2018-11-14

## 2018-11-14 VITALS
TEMPERATURE: 98.5 F | SYSTOLIC BLOOD PRESSURE: 112 MMHG | HEIGHT: 64 IN | OXYGEN SATURATION: 99 % | BODY MASS INDEX: 32.61 KG/M2 | HEART RATE: 81 BPM | WEIGHT: 191 LBS | DIASTOLIC BLOOD PRESSURE: 82 MMHG | RESPIRATION RATE: 14 BRPM

## 2018-11-14 DIAGNOSIS — E66.9 OBESITY (BMI 30-39.9): Primary | ICD-10-CM

## 2018-11-14 DIAGNOSIS — R50.9 FEVER, UNSPECIFIED FEVER CAUSE: ICD-10-CM

## 2018-11-14 DIAGNOSIS — R06.00 DYSPNEA, UNSPECIFIED TYPE: ICD-10-CM

## 2018-11-14 DIAGNOSIS — J45.901 EXACERBATION OF ASTHMA, UNSPECIFIED ASTHMA SEVERITY, UNSPECIFIED WHETHER PERSISTENT: ICD-10-CM

## 2018-11-14 DIAGNOSIS — R05.9 COUGH: ICD-10-CM

## 2018-11-14 DIAGNOSIS — Z00.00 ROUTINE GENERAL MEDICAL EXAMINATION AT A HEALTH CARE FACILITY: ICD-10-CM

## 2018-11-14 DIAGNOSIS — Z00.00 ENCOUNTER FOR MEDICAL EXAMINATION TO ESTABLISH CARE: ICD-10-CM

## 2018-11-14 DIAGNOSIS — E66.9 OBESITY (BMI 30-39.9): ICD-10-CM

## 2018-11-14 DIAGNOSIS — J06.9 VIRAL UPPER RESPIRATORY TRACT INFECTION: ICD-10-CM

## 2018-11-14 LAB
ALBUMIN SERPL-MCNC: 4.1 G/DL (ref 3.4–5)
ALBUMIN/GLOB SERPL: 1.2 {RATIO} (ref 0.8–1.7)
ALP SERPL-CCNC: 53 U/L (ref 45–117)
ALT SERPL-CCNC: 25 U/L (ref 13–56)
ANION GAP SERPL CALC-SCNC: 8 MMOL/L (ref 3–18)
APPEARANCE UR: CLEAR
AST SERPL-CCNC: 9 U/L (ref 15–37)
BASOPHILS # BLD: 0 K/UL (ref 0–0.1)
BASOPHILS NFR BLD: 0 % (ref 0–2)
BILIRUB SERPL-MCNC: 0.4 MG/DL (ref 0.2–1)
BILIRUB UR QL: NEGATIVE
BUN SERPL-MCNC: 8 MG/DL (ref 7–18)
BUN/CREAT SERPL: 10 (ref 12–20)
CALCIUM SERPL-MCNC: 9 MG/DL (ref 8.5–10.1)
CHLORIDE SERPL-SCNC: 105 MMOL/L (ref 100–108)
CO2 SERPL-SCNC: 26 MMOL/L (ref 21–32)
COLOR UR: YELLOW
CREAT SERPL-MCNC: 0.81 MG/DL (ref 0.6–1.3)
DIFFERENTIAL METHOD BLD: NORMAL
EOSINOPHIL # BLD: 0.1 K/UL (ref 0–0.4)
EOSINOPHIL NFR BLD: 1 % (ref 0–5)
ERYTHROCYTE [DISTWIDTH] IN BLOOD BY AUTOMATED COUNT: 13 % (ref 11.6–14.5)
EST. AVERAGE GLUCOSE BLD GHB EST-MCNC: 120 MG/DL
GLOBULIN SER CALC-MCNC: 3.4 G/DL (ref 2–4)
GLUCOSE SERPL-MCNC: 99 MG/DL (ref 74–99)
GLUCOSE UR STRIP.AUTO-MCNC: NEGATIVE MG/DL
HBA1C MFR BLD: 5.8 % (ref 4.2–5.6)
HCT VFR BLD AUTO: 42.4 % (ref 35–45)
HGB BLD-MCNC: 13.8 G/DL (ref 12–16)
HGB UR QL STRIP: NEGATIVE
KETONES UR QL STRIP.AUTO: NEGATIVE MG/DL
LEUKOCYTE ESTERASE UR QL STRIP.AUTO: NEGATIVE
LYMPHOCYTES # BLD: 2 K/UL (ref 0.9–3.6)
LYMPHOCYTES NFR BLD: 43 % (ref 21–52)
MCH RBC QN AUTO: 28.3 PG (ref 24–34)
MCHC RBC AUTO-ENTMCNC: 32.5 G/DL (ref 31–37)
MCV RBC AUTO: 87.1 FL (ref 74–97)
MONOCYTES # BLD: 0.4 K/UL (ref 0.05–1.2)
MONOCYTES NFR BLD: 8 % (ref 3–10)
NEUTS SEG # BLD: 2.2 K/UL (ref 1.8–8)
NEUTS SEG NFR BLD: 48 % (ref 40–73)
NITRITE UR QL STRIP.AUTO: NEGATIVE
PH UR STRIP: 5.5 [PH] (ref 5–8)
PLATELET # BLD AUTO: 344 K/UL (ref 135–420)
PMV BLD AUTO: 10.7 FL (ref 9.2–11.8)
POTASSIUM SERPL-SCNC: 4.2 MMOL/L (ref 3.5–5.5)
PROT SERPL-MCNC: 7.5 G/DL (ref 6.4–8.2)
PROT UR STRIP-MCNC: NEGATIVE MG/DL
QUICKVUE INFLUENZA TEST: NEGATIVE
RBC # BLD AUTO: 4.87 M/UL (ref 4.2–5.3)
S PYO AG THROAT QL: NORMAL
SODIUM SERPL-SCNC: 139 MMOL/L (ref 136–145)
SP GR UR REFRACTOMETRY: 1.02 (ref 1–1.03)
TSH SERPL DL<=0.05 MIU/L-ACNC: 1.3 UIU/ML (ref 0.36–3.74)
UROBILINOGEN UR QL STRIP.AUTO: 0.2 EU/DL (ref 0.2–1)
VALID INTERNAL CONTROL?: YES
VALID INTERNAL CONTROL?: YES
WBC # BLD AUTO: 4.6 K/UL (ref 4.6–13.2)

## 2018-11-14 PROCEDURE — 36415 COLL VENOUS BLD VENIPUNCTURE: CPT

## 2018-11-14 PROCEDURE — 81003 URINALYSIS AUTO W/O SCOPE: CPT

## 2018-11-14 PROCEDURE — 85025 COMPLETE CBC W/AUTO DIFF WBC: CPT

## 2018-11-14 PROCEDURE — 80053 COMPREHEN METABOLIC PANEL: CPT

## 2018-11-14 PROCEDURE — 83036 HEMOGLOBIN GLYCOSYLATED A1C: CPT

## 2018-11-14 PROCEDURE — 84443 ASSAY THYROID STIM HORMONE: CPT

## 2018-11-14 PROCEDURE — 87070 CULTURE OTHR SPECIMN AEROBIC: CPT

## 2018-11-14 RX ORDER — BENZONATATE 200 MG/1
200 CAPSULE ORAL
Qty: 30 CAP | Refills: 0 | Status: SHIPPED | OUTPATIENT
Start: 2018-11-14 | End: 2018-11-21

## 2018-11-14 RX ORDER — FLUTICASONE PROPIONATE 44 UG/1
2 AEROSOL, METERED RESPIRATORY (INHALATION) 2 TIMES DAILY
Qty: 1 INHALER | Refills: 1 | Status: SHIPPED | OUTPATIENT
Start: 2018-11-14 | End: 2019-07-11 | Stop reason: SDUPTHER

## 2018-11-14 RX ORDER — FLUTICASONE PROPIONATE 50 MCG
SPRAY, SUSPENSION (ML) NASAL
Qty: 1 BOTTLE | Refills: 1 | Status: SHIPPED | OUTPATIENT
Start: 2018-11-14 | End: 2019-07-11 | Stop reason: SDUPTHER

## 2018-11-14 RX ORDER — BUDESONIDE AND FORMOTEROL FUMARATE DIHYDRATE 80; 4.5 UG/1; UG/1
2 AEROSOL RESPIRATORY (INHALATION) 2 TIMES DAILY
Qty: 1 INHALER | Refills: 1 | Status: SHIPPED | OUTPATIENT
Start: 2018-11-14 | End: 2018-11-14 | Stop reason: ALTCHOICE

## 2018-11-14 NOTE — LETTER
NOTIFICATION RETURN TO WORK / SCHOOL 
 
11/14/2018 2:34 PM 
 
Ms. Terence Marshall [address] [place] [phone] To Whom It May Concern: 
 
Maribel Sosa is currently under the care of Chino Davis. She will return to work/school on: 11/19/18 If there are questions or concerns please have the patient contact our office. Sincerely, Cierra Portillo NP

## 2018-11-14 NOTE — PROGRESS NOTES
Alisa Harding is a 34 y.o.  female and presents with    Chief Complaint   Patient presents with    Establish Care     fever x 2 days, sore throat, ear pain, difficulty breathing and increased asthma       Subjective:  HPI   Mrs. Sosa presents today to [facility]. She is with cold like symptoms of fever of 101 and 100, sore throat, ear pain, frontal headaches, chest congestion, coughing, wheezing, dyspnea, increased fatigue. She has a history of IgE Asthma. Waking up coughing, wheezing. 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 Fever this am 100, took Tylenol, in office today afebrile. She is using the Proventil 4-5 x a day hand inhaler, and nebulizer treatments every 3 hours. She reports relief for short periods. She reports usually uses the Albuterol weekly of 1-2 times a week. She has a history of IGE medicated Asthma. She is not currently under the care of a pulmonologist, allergist, or ENT. She is using Albuterol 1-2 times weekly and Nebulizer treatments. She reports past use of Flovent, Advair- Not current. Denies hx of smoking. She was with an allergist in the past. She declines history of allergy shots. She has an epi pen. She is with a history of chronic abdominal pain and obesity. She saw Dr. Quan Morris, gastroenterology, in the past. On 10/4/18 Lipase, CMP, CBC, and Hcg were negative. HIV 10/18 non-reactive. Past work up with SED rate, CRP, SLE profile, RF, CCP ab, JABARI, Hep C, and RPR all negative. TSH 9/2016 normal. CT abd/pelvis 4/2016 No acute findings to explain abdominal pain except for moderate stool burden, Increased size of a left left greater than right lower vaginal wall cysts most compatible with Bartholin's gland cysts.  She had an US abdomen 10/2016 showed Mildly increased hepatic echogenicity, may be seen with hepatic steatosis or other intrinsic liver disease.  No solid liver mass.  No biliary ductal dilatation.  Gallbladder within normal limits. The hepatobiliary Scan 10/2016 showed negative cystic duct obstruction, delayed hepatic to small bowel transit. Xray abd 10/2018 showed increase stool in colon otherwise negative. EGD performed per patient recently however I do not see this report. She is taking Nexium and Mylanta, Zofran, Miralax. She is with history of tension HA. She is taking amitriptyline (Elavil) for maintenance therapy with control. Additional Concerns: none     ROS   Review of Systems   Constitutional: Positive for chills, fever and malaise/fatigue. HENT: Positive for congestion, ear pain, sinus pain and sore throat. Eyes: Negative. Respiratory: Positive for cough, shortness of breath and wheezing. Negative for hemoptysis and sputum production. Cardiovascular: Negative. Gastrointestinal: Negative. Genitourinary: Negative. Musculoskeletal: Negative. Skin: Negative. Neurological: Negative. Psychiatric/Behavioral: Negative. Allergies   Allergen Reactions    Nsaids (Non-Steroidal Anti-Inflammatory Drug) Nausea and Vomiting    Percocet [Oxycodone-Acetaminophen] Nausea and Vomiting    Tramadol Other (comments)     Abdominal pain       Current Outpatient Medications   Medication Sig Dispense Refill    benzonatate (TESSALON) 200 mg capsule Take 1 Cap by mouth three (3) times daily as needed for Cough for up to 7 days. 30 Cap 0    fluticasone (FLOVENT HFA) 44 mcg/actuation inhaler Take 2 Puffs by inhalation two (2) times a day. 1 Inhaler 1    inhalational spacing device 1 Each by Does Not Apply route as needed. 1 Device 0    fluticasone (FLONASE) 50 mcg/actuation nasal spray 1 puff to each nare after nasal saline rinse before bedtime.  1 Bottle 1    PROAIR HFA 90 mcg/actuation inhaler INHALE 2 PUFFS BY MOUTH EVERY 4 HOURS AS NEEDED FOR WHEEZING 1 Inhaler 0    ondansetron hcl (ZOFRAN) 4 mg tablet Take 1 Tab by mouth every eight (8) hours as needed for Nausea. 12 Tab 0    alum-mag hydroxide-simeth (MYLANTA) 200-200-20 mg/5 mL susp Take 30 mL by mouth three (3) times daily (with meals). 1 Bottle 0    polyethylene glycol (MIRALAX) 17 gram/dose powder Take 17 g by mouth daily. 1 tablespoon with 8 oz of water daily (Patient taking differently: Take 17 g by mouth daily as needed. 1 tablespoon with 8 oz of water daily) 100 g 0    esomeprazole (NEXIUM) 40 mg capsule Take 1 Cap by mouth daily. 30 Cap 0    albuterol (PROVENTIL VENTOLIN) 2.5 mg /3 mL (0.083 %) nebulizer solution 3 mL by Nebulization route every four (4) hours as needed for Wheezing. 60 Each 0    amitriptyline (ELAVIL) 25 mg tablet Take 1 Tab by mouth nightly. 30 Tab 2    EPINEPHrine (EPIPEN) 0.3 mg/0.3 mL injection 0.3 mL by IntraMUSCular route once as needed. 1 Syringe 0    inhalational spacing device 1 Each by Does Not Apply route as needed.  1 Each 0       Social History     Socioeconomic History    Marital status: SINGLE     Spouse name: Not on file    Number of children: Not on file    Years of education: Not on file    Highest education level: Not on file   Social Needs    Financial resource strain: Not on file    Food insecurity - worry: Not on file    Food insecurity - inability: Not on file    Transportation needs - medical: Not on file    Transportation needs - non-medical: Not on file   Occupational History    Not on file   Tobacco Use    Smoking status: Never Smoker    Smokeless tobacco: Never Used   Substance and Sexual Activity    Alcohol use: No     Frequency: Never    Drug use: No    Sexual activity: Yes     Partners: Female   Other Topics Concern    Not on file   Social History Narrative    Not on file       Past Medical History:   Diagnosis Date    Asthma     Gastrointestinal disorder     ulcers    Ovarian cyst     Ulcer        Past Surgical History:   Procedure Laterality Date    HX ENDOSCOPY  10/2018    HX ORTHOPAEDIC      bilateral knee surgery Family History   Problem Relation Age of Onset    Diabetes Maternal Grandmother     Hypertension Father     Breast Cancer Paternal Grandmother     Cancer Neg Hx     Heart Disease Neg Hx     Stroke Neg Hx        Objective:  Vitals:    11/14/18 1331   BP: 112/82   Pulse: 81   Resp: 14   Temp: 98.5 °F (36.9 °C)   TempSrc: Oral   SpO2: 99%   Weight: 191 lb (86.6 kg)   Height: 5' 4\" (1.626 m)   PainSc:   0 - No pain   LMP: 11/05/2018       LABS   Results for orders placed or performed in visit on 11/14/18   AMB POC RAPID INFLUENZA TEST   Result Value Ref Range    VALID INTERNAL CONTROL POC Yes     QuickVue Influenza test Negative Negative   AMB POC RAPID STREP A   Result Value Ref Range    VALID INTERNAL CONTROL POC Yes     Group A Strep Ag Neg-culture sent Negative       TESTS  XR Results (most recent):  Results from Hospital Encounter encounter on 10/04/18   XR ABD ACUTE W 1 V CHEST    Narrative EXAM: ACUTE ABDOMINAL SERIES    CLINICAL HISTORY/INDICATION: abdominal pain with blood streaked vomiting and  epigastric pain, post endoscopy with biopsy performed same day    COMPARISON: Chest x-ray March 15, 2016. TECHNIQUE: Supine and upright views of the abdomen have been obtained as well as  a PA view of the chest.      FINDINGS:      There is gas and increased volume of stool scattered throughout the colon. There is no bowel dilatation nor free air. There are a few air fluid levels  within the midabdomen which are within normal limits. No organomegaly is noted. The cardiac and mediastinal silhouette is normal. The lungs are likely clear. Hair braid artifact projects over the upper lungs bilaterally. .  Pulmonary  vascularity is normal.      Impression IMPRESSION:    No acute chest disease. Hair braid artifact. Increased stool throughout the colon. PE  Physical Exam   Constitutional: She is oriented to person, place, and time. She appears well-developed and well-nourished. No distress. HENT:   Head: Normocephalic and atraumatic. Mouth/Throat: No oropharyngeal exudate. Cerumen impaction  Erythematous oropharynx  Edematous nares right worse than left   Eyes: Conjunctivae and EOM are normal. Pupils are equal, round, and reactive to light. Right eye exhibits no discharge. Left eye exhibits no discharge. Neck: Normal range of motion. Cardiovascular: Normal rate, regular rhythm, normal heart sounds and intact distal pulses. Pulmonary/Chest: Effort normal and breath sounds normal. No respiratory distress. She has no wheezes. She has no rales. She exhibits no tenderness. Abdominal: Soft. Bowel sounds are normal.   Musculoskeletal: Normal range of motion. Lymphadenopathy:     She has cervical adenopathy. Neurological: She is alert and oriented to person, place, and time. Skin: Skin is warm and dry. She is not diaphoretic. Psychiatric: She has a normal mood and affect. Her behavior is normal. Judgment and thought content normal.   Vitals reviewed. Assessment/Plan:    1. Establish care- Labs today, CPE in 2 weeks. She reports MGM- with DM and fmh of breast cancer age approx 64 in first degree relative. Referral to Ob/gyn ordered, last pap 7/2017 normal.     2. IGE mediated Asthma exacerbation secondary to URI- Centor score-2Rapid strep and influenza negative. Throat culture sent. Work note provided. Flovent prescribed. Start antihistamine and below symptomatic treatment. Tessalon prescribed for cough. Instructed to call if new or worsening symptoms. Handouts provided. 3. Cerumen impaction bilaterally- Lavage completed today but left still with some impaction, start Debrox.      Symptomatic Treatment:     Make sure you are staying hydrated, 6-8 glasses of water daily, warm teas with honey to soothe a sore throat, throat lozenges, cool mist humidifier    Over the counter medications:     Nasal saline rinses followed by Flonase or Nasacort 1 puff to each side of the nose to be done daily prior to bedtime to help decreased nasal congestion and post nasal drip. Mucinex daily to help loosen chest and nasal congestion and needs to be taken daily with good hydration to provide relief. Antihistamines (Claritin, Zyrtec, Allegra) help dry up congestion and decrease watery or itchy eyes, ear fullness if related to noninfectious fluid behind the ear drum, and itchy ears. Sudafed- helps with congestion and cough. This medication does contain phenylephrine so be cautious while taking this medication if you have a diagnosis of elevated blood pressure. Tylenol, Naproxsyn, Aleve can be used to help relieve pain/tenderness/headaches/fever    Prescription medications:    Tessalon tablets take to help reduce cough. Lab review: orders written for new lab studies as appropriate; see orders    Today's Visit:   Diagnoses and all orders for this visit:    1. Obesity (BMI 30-39.9)  -     CBC WITH AUTOMATED DIFF; Future  -     METABOLIC PANEL, COMPREHENSIVE; Future  -     HEMOGLOBIN A1C WITH EAG; Future  -     TSH 3RD GENERATION; Future  -     URINALYSIS W/ RFLX MICROSCOPIC; Future    2. Exacerbation of asthma, unspecified asthma severity, unspecified whether persistent  -     fluticasone (FLOVENT HFA) 44 mcg/actuation inhaler; Take 2 Puffs by inhalation two (2) times a day. -     inhalational spacing device; 1 Each by Does Not Apply route as needed. 3. Encounter for medical examination to establish care    4. Routine general medical examination at a health care facility  -     CBC WITH AUTOMATED DIFF; Future  -     METABOLIC PANEL, COMPREHENSIVE; Future  -     HEMOGLOBIN A1C WITH EAG; Future  -     TSH 3RD GENERATION; Future  -     URINALYSIS W/ RFLX MICROSCOPIC; Future    5. Cough  -     AMB POC RAPID INFLUENZA TEST  -     AMB POC RAPID STREP A  -     THROAT CULTURE; Future  -     benzonatate (TESSALON) 200 mg capsule;  Take 1 Cap by mouth three (3) times daily as needed for Cough for up to 7 days.  -     fluticasone (FLONASE) 50 mcg/actuation nasal spray; 1 puff to each nare after nasal saline rinse before bedtime. 6. Fever, unspecified fever cause  -     AMB POC RAPID INFLUENZA TEST  -     AMB POC RAPID STREP A  -     THROAT CULTURE; Future    7. Dyspnea, unspecified type  -     AMB POC RAPID INFLUENZA TEST  -     AMB POC RAPID STREP A  -     THROAT CULTURE; Future    8. Viral upper respiratory tract infection  -     fluticasone (FLONASE) 50 mcg/actuation nasal spray; 1 puff to each nare after nasal saline rinse before bedtime. Health Maintenance: PNA 23 next visit. Referral to [facility]- last pap 2017. I have discussed the diagnosis with the patient and the intended plan as seen in the above orders. The patient has received an after-visit summary and questions were answered concerning future plans. I have discussed medication side effects and warnings with the patient as well. I have reviewed the plan of care with the patient, accepted their input and they are in agreement with the treatment goals. Follow-up Disposition:  Return in about 2 weeks (around 11/28/2018) for cpe, lab review, asthma, obesity. More than 1/2 of this 30 minute visit was spent in counseling and coordination of care, as described above.     JYOTI Weathers  Internist of [address], [address] [place] [address].  Phone: [phone]  Fax: [phone]

## 2018-11-14 NOTE — PATIENT INSTRUCTIONS
Symptomatic Treatment:     Make sure you are staying hydrated, 6-8 glasses of water daily, warm teas with honey to soothe a sore throat, throat lozenges, cool mist humidifier    Over the counter medications:     Nasal saline rinses followed by Flonase or Nasacort 1 puff to each side of the nose to be done daily prior to bedtime to help decreased nasal congestion and post nasal drip. Mucinex daily to help loosen chest and nasal congestion and needs to be taken daily with good hydration to provide relief. Antihistamines (Claritin, Zyrtec, Allegra) help dry up congestion and decrease watery or itchy eyes, ear fullness if related to noninfectious fluid behind the ear drum, and itchy ears. Sudafed- helps with congestion and cough. This medication does contain phenylephrine so be cautious while taking this medication if you have a diagnosis of elevated blood pressure. Tylenol, Naproxsyn, Aleve can be used to help relieve pain/tenderness/headaches/fever    Prescription medications:    Tessalon tablets take to help reduce cough.

## 2018-11-14 NOTE — PROGRESS NOTES
1. Have you been to the ER, urgent care clinic or hospitalized since your last visit? NO.     2. Have you seen or consulted any other health care providers outside of the [facility] since your last visit (Include any pap smears or colon screening)?  NO

## 2018-11-16 LAB
BACTERIA SPEC CULT: NORMAL
SERVICE CMNT-IMP: NORMAL

## 2018-11-18 ENCOUNTER — TELEPHONE (OUTPATIENT)
Dept: INTERNAL MEDICINE CLINIC | Age: 33
End: 2018-11-18

## 2018-11-19 NOTE — TELEPHONE ENCOUNTER
Please inform the patient that the labs levels show she is prediabetic range. Please work on diet and exercise management. We can discuss further at future appointments. All other labs looked good to include a negative throat culture. Please see how she is feeling as she was having an asthma exacerbation and I started her on Flovent-please reinforce to continue to take this daily.

## 2018-12-07 ENCOUNTER — OFFICE VISIT (OUTPATIENT)
Dept: INTERNAL MEDICINE CLINIC | Age: 33
End: 2018-12-07

## 2018-12-07 VITALS
HEIGHT: 64 IN | TEMPERATURE: 98.7 F | DIASTOLIC BLOOD PRESSURE: 84 MMHG | HEART RATE: 66 BPM | SYSTOLIC BLOOD PRESSURE: 100 MMHG | BODY MASS INDEX: 33.12 KG/M2 | OXYGEN SATURATION: 98 % | RESPIRATION RATE: 14 BRPM | WEIGHT: 194 LBS

## 2018-12-07 DIAGNOSIS — Z00.00 ROUTINE GENERAL MEDICAL EXAMINATION AT A HEALTH CARE FACILITY: ICD-10-CM

## 2018-12-07 DIAGNOSIS — E66.9 OBESITY (BMI 30.0-34.9): ICD-10-CM

## 2018-12-07 DIAGNOSIS — R73.03 PREDIABETES: Primary | ICD-10-CM

## 2018-12-07 NOTE — PROGRESS NOTES
Margarita Glynn is a 34 y.o.  female and presents with Chief Complaint Patient presents with  Follow-up Patient had URI 3 weeks ago states feeling better. Subjective: HPI Mrs. Sosa presents today for physical examination and lab review. She presented about 3 weeks ago with exacerbation of her asthma with URI. She reports today much improved. She has no new concerns today. She has a history of IGE medicated Asthma. She is not currently under the care of a pulmonologist, allergist, or ENT. She is using Albuterol 1-2 times weekly and Nebulizer treatments. She reports past use of Flovent, Advair- Not current. Denies hx of smoking. She was with an allergist in the past. She declines history of allergy shots. She has an epi pen. 
  
She is with a history of chronic abdominal pain and obesity. She saw Dr. Ignacio Landry, gastroenterology, in the past. On 10/4/18 Lipase, CMP, CBC, and Hcg were negative. HIV 10/18 non-reactive. Past work up with SED rate, CRP, SLE profile, RF, CCP ab, JABARI, Hep C, and RPR all negative. TSH 9/2016 normal. CT abd/pelvis 4/2016 No acute findings to explain abdominal pain except for moderate stool burden, Increased size of a left left greater than right lower vaginal wall cysts most compatible with Bartholin's gland cysts. She had an US abdomen 10/2016 showed Mildly increased hepatic echogenicity, may be seen with hepatic steatosis or other intrinsic liver disease.  No solid liver mass.  No biliary ductal dilatation.  Gallbladder within normal limits. The hepatobiliary Scan 10/2016 showed negative cystic duct obstruction, delayed hepatic to small bowel transit. Xray abd 10/2018 showed increase stool in colon otherwise negative. EGD performed per patient recently however I do not see this report. She is taking Nexium and Mylanta, Zofran, Miralax.  
  
She is with history of tension HA.  She is taking amitriptyline (Elavil) for maintenance therapy with control.  
  
Additional Concerns: none ROS Review of Systems Constitutional: Negative. HENT: Negative. Eyes: Negative. Respiratory: Negative. Cardiovascular: Negative. Gastrointestinal: Negative. Genitourinary: Negative. Musculoskeletal: Negative. Skin: Negative. Neurological: Negative. Endo/Heme/Allergies: Negative. Psychiatric/Behavioral: Negative. Allergies Allergen Reactions  Nsaids (Non-Steroidal Anti-Inflammatory Drug) Nausea and Vomiting  Percocet [Oxycodone-Acetaminophen] Nausea and Vomiting  Tramadol Other (comments) Abdominal pain Current Outpatient Medications Medication Sig Dispense Refill  xmjsadlcwlnofe-jgront-jauqo 10 mg- 0.0375 %-5 % CTPT cyclobenzaprine 10 mg tablet  fluticasone (FLOVENT HFA) 44 mcg/actuation inhaler Take 2 Puffs by inhalation two (2) times a day. 1 Inhaler 1  
 fluticasone (FLONASE) 50 mcg/actuation nasal spray 1 puff to each nare after nasal saline rinse before bedtime. 1 Bottle 1  
 PROAIR HFA 90 mcg/actuation inhaler INHALE 2 PUFFS BY MOUTH EVERY 4 HOURS AS NEEDED FOR WHEEZING 1 Inhaler 0  
 ondansetron hcl (ZOFRAN) 4 mg tablet Take 1 Tab by mouth every eight (8) hours as needed for Nausea. 12 Tab 0  
 alum-mag hydroxide-simeth (MYLANTA) 200-200-20 mg/5 mL susp Take 30 mL by mouth three (3) times daily (with meals). 1 Bottle 0  
 polyethylene glycol (MIRALAX) 17 gram/dose powder Take 17 g by mouth daily. 1 tablespoon with 8 oz of water daily (Patient taking differently: Take 17 g by mouth daily as needed. 1 tablespoon with 8 oz of water daily) 100 g 0  
 esomeprazole (NEXIUM) 40 mg capsule Take 1 Cap by mouth daily. 30 Cap 0  
 albuterol (PROVENTIL VENTOLIN) 2.5 mg /3 mL (0.083 %) nebulizer solution 3 mL by Nebulization route every four (4) hours as needed for Wheezing. 60 Each 0  
 amitriptyline (ELAVIL) 25 mg tablet Take 1 Tab by mouth nightly.  30 Tab 2  
  EPINEPHrine (EPIPEN) 0.3 mg/0.3 mL injection 0.3 mL by IntraMUSCular route once as needed. 1 Syringe 0  
 inhalational spacing device 1 Each by Does Not Apply route as needed. 1 Each 0 Social History Socioeconomic History  Marital status: SINGLE Spouse name: Not on file  Number of children: Not on file  Years of education: Not on file  Highest education level: Not on file Social Needs  Financial resource strain: Not on file  Food insecurity - worry: Not on file  Food insecurity - inability: Not on file  Transportation needs - medical: Not on file  Transportation needs - non-medical: Not on file Occupational History  Not on file Tobacco Use  Smoking status: Never Smoker  Smokeless tobacco: Never Used Substance and Sexual Activity  Alcohol use: No  
  Frequency: Never  Drug use: No  
 Sexual activity: Yes  
  Partners: Female Other Topics Concern  Not on file Social History Narrative  Not on file Past Medical History:  
Diagnosis Date  Asthma  Gastrointestinal disorder   
 ulcers  Ovarian cyst   
 Ulcer Past Surgical History:  
Procedure Laterality Date  HX ENDOSCOPY  10/2018  HX ORTHOPAEDIC    
 bilateral knee surgery Family History Problem Relation Age of Onset  Diabetes Maternal Grandmother  Hypertension Father  Breast Cancer Paternal Grandmother  Cancer Neg Hx   
 Heart Disease Neg Hx  Stroke Neg Hx Objective: 
Vitals:  
 12/07/18 1423 BP: 100/84 Pulse: 66 Resp: 14 Temp: 98.7 °F (37.1 °C) TempSrc: Oral  
SpO2: 98% Weight: 194 lb (88 kg) Height: 5' 4\" (1.626 m) PainSc:   0 - No pain LABS Results for orders placed or performed during the hospital encounter of 11/14/18 THROAT CULTURE Result Value Ref Range Special Requests: NO SPECIAL REQUESTS  Culture result: NO BETA HEMOLYTIC STREPTOCOCCUS GROUP A ISOLATED    
CBC WITH AUTOMATED DIFF  
 Result Value Ref Range WBC 4.6 4.6 - 13.2 K/uL  
 RBC 4.87 4.20 - 5.30 M/uL  
 HGB 13.8 12.0 - 16.0 g/dL HCT 42.4 35.0 - 45.0 % MCV 87.1 74.0 - 97.0 FL  
 MCH 28.3 24.0 - 34.0 PG  
 MCHC 32.5 31.0 - 37.0 g/dL  
 RDW 13.0 11.6 - 14.5 % PLATELET 890 862 - 277 K/uL MPV 10.7 9.2 - 11.8 FL  
 NEUTROPHILS 48 40 - 73 % LYMPHOCYTES 43 21 - 52 % MONOCYTES 8 3 - 10 % EOSINOPHILS 1 0 - 5 % BASOPHILS 0 0 - 2 %  
 ABS. NEUTROPHILS 2.2 1.8 - 8.0 K/UL  
 ABS. LYMPHOCYTES 2.0 0.9 - 3.6 K/UL  
 ABS. MONOCYTES 0.4 0.05 - 1.2 K/UL  
 ABS. EOSINOPHILS 0.1 0.0 - 0.4 K/UL  
 ABS. BASOPHILS 0.0 0.0 - 0.1 K/UL  
 DF AUTOMATED METABOLIC PANEL, COMPREHENSIVE Result Value Ref Range Sodium 139 136 - 145 mmol/L Potassium 4.2 3.5 - 5.5 mmol/L Chloride 105 100 - 108 mmol/L  
 CO2 26 21 - 32 mmol/L Anion gap 8 3.0 - 18 mmol/L Glucose 99 74 - 99 mg/dL BUN 8 7.0 - 18 MG/DL Creatinine 0.81 0.6 - 1.3 MG/DL  
 BUN/Creatinine ratio 10 (L) 12 - 20 GFR est AA >60 >60 ml/min/1.73m2 GFR est non-AA >60 >60 ml/min/1.73m2 Calcium 9.0 8.5 - 10.1 MG/DL Bilirubin, total 0.4 0.2 - 1.0 MG/DL  
 ALT (SGPT) 25 13 - 56 U/L  
 AST (SGOT) 9 (L) 15 - 37 U/L Alk. phosphatase 53 45 - 117 U/L Protein, total 7.5 6.4 - 8.2 g/dL Albumin 4.1 3.4 - 5.0 g/dL Globulin 3.4 2.0 - 4.0 g/dL A-G Ratio 1.2 0.8 - 1.7 HEMOGLOBIN A1C WITH EAG Result Value Ref Range Hemoglobin A1c 5.8 (H) 4.2 - 5.6 % Est. average glucose 120 mg/dL TSH 3RD GENERATION Result Value Ref Range TSH 1.30 0.36 - 3.74 uIU/mL URINALYSIS W/ RFLX MICROSCOPIC Result Value Ref Range Color YELLOW Appearance CLEAR Specific gravity 1.023 1.005 - 1.030    
 pH (UA) 5.5 5.0 - 8.0 Protein NEGATIVE  NEG mg/dL Glucose NEGATIVE  NEG mg/dL Ketone NEGATIVE  NEG mg/dL Bilirubin NEGATIVE  NEG Blood NEGATIVE  NEG Urobilinogen 0.2 0.2 - 1.0 EU/dL  Nitrites NEGATIVE  NEG    
 Leukocyte Esterase NEGATIVE  NEG    
 
 
TESTS 
none PE Physical Exam  
Constitutional: She is oriented to person, place, and time. She appears well-developed and well-nourished. No distress. HENT:  
Head: Normocephalic and atraumatic. Right Ear: External ear normal.  
Left Ear: External ear normal.  
Nose: Nose normal.  
Mouth/Throat: Oropharynx is clear and moist. No oropharyngeal exudate. Eyes: Conjunctivae and EOM are normal. Pupils are equal, round, and reactive to light. Right eye exhibits no discharge. Left eye exhibits no discharge. Neck: Normal range of motion. Neck supple. Cardiovascular: Normal rate, regular rhythm, normal heart sounds and intact distal pulses. No murmur heard. Pulmonary/Chest: Effort normal and breath sounds normal. No respiratory distress. She has no wheezes. She has no rales. She exhibits no tenderness. Abdominal: Soft. Bowel sounds are normal.  
Musculoskeletal: Normal range of motion. Lymphadenopathy:  
  She has no cervical adenopathy. Neurological: She is alert and oriented to person, place, and time. She has normal reflexes. No cranial nerve deficit. Skin: Skin is warm and dry. She is not diaphoretic. Psychiatric: She has a normal mood and affect. Her behavior is normal. Judgment and thought content normal.  
Vitals reviewed. Assessment/Plan: 1. Routine CPE today with lab review- completed. 2. Prediabetes/Obesity- Discussed diet and exercise. Referral to Nutritionist. Recommended to make a food diary and see where areas to work on. Remove sweets/sweet drinks, fatty foods, limit processed foods. 3. Asthma- continue Flovent daily, Albuterol as needed. Lab review: labs are reviewed, up to date and normal 
 
Today's Visit:  
Diagnoses and all orders for this visit: 1. Prediabetes 
-     REFERRAL TO NUTRITION 2. Routine general medical examination at a health care facility 3. Obesity (BMI 30.0-34. 9) Health Maintenance: vaccine on next visit. I have discussed the diagnosis with the patient and the intended plan as seen in the above orders. The patient has received an after-visit summary and questions were answered concerning future plans. I have discussed medication side effects and warnings with the patient as well. I have reviewed the plan of care with the patient, accepted their input and they are in agreement with the treatment goals. Follow-up Disposition: Not on File More than 1/2 of this 30 minute visit was spent in counseling and coordination of care, as described above. JYOTI Sims Internist of [facility] [address] [place], [address] Phone: [phone] Fax: [phone]

## 2018-12-07 NOTE — PROGRESS NOTES
ROOM # 12 Leidy Paiz presents today for Chief Complaint Patient presents with  Follow-up Patient had URI 3 weeks ago states feeling better. Matty Sosa preferred language for health care discussion is english/other. Depression Screening: PHQ over the last two weeks 12/7/2018 11/14/2018 9/6/2018 10/2/2017 3/22/2017 11/23/2016 5/2/2016 Little interest or pleasure in doing things Not at all Not at all Not at all Not at all Not at all Not at all Not at all Feeling down, depressed, irritable, or hopeless Not at all Not at all Not at all Not at all Not at all Not at all Not at all Total Score PHQ 2 0 0 0 0 0 0 0 Learning Assessment: 
Learning Assessment 11/14/2018 5/2/2016 PRIMARY LEARNER Patient Patient HIGHEST LEVEL OF EDUCATION - PRIMARY LEARNER  - 2 YEARS OF COLLEGE  
BARRIERS PRIMARY LEARNER - NONE  
CO-LEARNER CAREGIVER - No  
PRIMARY LANGUAGE ENGLISH ENGLISH  NEED - No  
LEARNER PREFERENCE PRIMARY DEMONSTRATION READING  
LEARNING SPECIAL TOPICS - none ANSWERED BY patient patient RELATIONSHIP SELF SELF  
ASSESSMENT COMMENT - n/a Abuse Screening: 
Abuse Screening Questionnaire 11/14/2018 5/2/2016 Do you ever feel afraid of your partner? Doug Just Are you in a relationship with someone who physically or mentally threatens you? Doug Just Is it safe for you to go home? Lizette Fry Fall Risk No flowsheet data found. Visit Vitals /84 (BP 1 Location: Left arm, BP Patient Position: Sitting) Pulse 66 Temp 98.7 °F (37.1 °C) (Oral) Resp 14 Ht 5' 4\" (1.626 m) Wt 194 lb (88 kg) SpO2 98% BMI 33.30 kg/m² Health Maintenance reviewed and discussed per provider. Yes Clearance Anabella Sosa is due for Health Maintenance Due Topic Date Due  Pneumococcal 19-64 Medium Risk (1 of 1 - PPSV23) 08/15/2006 Please order/place referral if appropriate. Advance Directive: 1. Do you have an advance directive in place? Patient Reply: No 
 
2. If not, would you like material regarding how to put one in place? Patient Reply: No 
 
Coordination of Care: 1. Have you been to the ER, urgent care clinic since your last visit? Hospitalized since your last visit?  No

## 2019-02-01 ENCOUNTER — HOSPITAL ENCOUNTER (OUTPATIENT)
Dept: NUTRITION | Age: 34
Discharge: HOME | End: 2019-02-01
Payer: COMMERCIAL

## 2019-02-01 PROCEDURE — 97802 MEDICAL NUTRITION INDIV IN: CPT

## 2019-02-01 NOTE — PROGRESS NOTES
[address] [place] [address] [address], [address] [facility], [address] [address] Phone: [phone]  Fax: [phone] Nutrition Assessment  Medical Nutrition Therapy Outpatient Initial Evaluation Patient Name: Phillip Patel : 1985 Treatment Diagnosis: Prediabetes Referral Source: Jude Polk NP Start of Care [facility]): 2019 Gender: female Age: 34 y.o. Ht: 64 In Wt: 193.4 lb  kg BMI: 33.2 BMR Male  [facility] Female Anthropometrics Assessment: Per BMI, pt is considered obese. Moderate abdominal adiposity is evident based on visual observation Past Medical History includes: prediabetes Pertinent Medications:  
See [facility] Biochemical Data:  
Lab Results Component Value Date/Time Hemoglobin A1c 5.8 (H) 2018 02:56 PM  
 
No results found for: CHOL, CHOLPOCT, CHOLX, CHLST, CHOLV, HDL, HDLPOC, LDL, LDLCPOC, LDLC, DLDLP, VLDLC, VLDL, TGLX, TRIGL, TRIGP, TGLPOCT, CHHD, CHHDX Lab Results Component Value Date/Time ALT (SGPT) 25 2018 02:56 PM  
 AST (SGOT) 9 (L) 2018 02:56 PM  
 Alk. phosphatase 53 2018 02:56 PM  
 Bilirubin, total 0.4 2018 02:56 PM  
 
Lab Results Component Value Date/Time Creatinine 0.81 2018 02:56 PM  
 
Lab Results Component Value Date/Time BUN 8 2018 02:56 PM  
 
No results found for: Martinez Lanius, MCA2, MCA3, MCAU, MCAU2, MCALPOCT Subjective/Assessment: 
 Pt is here per doctor referral for nutrition education for prediabetes. She is interested in getting control and also weight loss. She mentioned she had her first blood sugar drop yesterday. She is exercising (started this week) before work for 30-45 min @ [employer]. She works second shift so her schedule for eating is not traditional. She typically eats when she gets to work (meal 1) and then [employer] and snacks the rest of the time. She is off at 2300 and goes to bed at 2162-9779.  Discussed eating habits, food and beverage choices and what controlled vs uncontrolled blood sugar can do. She works at [employer] and can eat in the cafeteria there. Current Eating Patterns: Meal 1: oatmeal and OJ Snacks the rest of the day, may have one other actual meal 
[place] etc. 
She does like vegetables, but also loves fried chicken Estimate Needs Calories: 1700  Protein: 128 Carbs: 170 Fat: 57 Kcal/day  g/day  g/day  g/day   
    percent: 30  40  30 Education & Recommendations provided: Educated pt on the pathophysiology of Type II Diabetes, insulin resistance and the rationale for dietary modifications and increased activity. Educated pt on carbohydrate food sources, counting carbohydrates, label reading, meal timing, and appropriate serving sizes. Encouraged pt to avoid sugary beverages. Handouts Provided: [x]  Carbohydrates [x]  Protein []  Fiber 
[x]  Serving Sizes [x]  Meal and Snack Ideas 
[]  Food Journals [x]  Diabetes []  Cholesterol 
[]  Sodium [x]  Gen Nutr Guidelines 
[]  SBGM Guidelines 
[]  Others:  
Information Reviewed with: Patient Readiness to Change Stage: []  Pre-contemplative    []  Contemplative [x]  Preparation               []  Action                  []  Maintenance Potential Barriers to Learning: []  Decline in memory    []  Language barrier   []  Other: 
[]  Emotional                  []  Limited mobility 
[]  Lack of motivation     [] Vision, hearing or cognitive impairment Expected Compliance: Fair due to changes as well as doctor direct vs patient directed care Nutritional Goal - To promote lifestyle changes to result in:   
[x]  Weight loss [x]  Improved diabetic control 
[]  Decreased cholesterol levels 
[]  Decreased blood pressure 
[]  Weight maintenance []  Preventing any interactions associated with food allergies []  Adequate weight gain toward goal weight 
[]  Other:  
  
 
Patient Goals: SMART goals 1. Follow consistent and appropriate meal timing; follow a structured timeline, space meals by 4-5 hours with snacks between if going longer than 5 hours 2. Focus on good sources of protein; Never eat carbs alone, always pair them with protein, 
 Carb Limits: 35-45 gm @ meals, 15-20 gm @ snacks 3. Use the meal builder tool + diet plan in conjunction with the plate method for portion contol and balance 4. Choose more water and sugar free beverages, wean off sweet tea and juice Dietitian Signature: Pily Gagnon MA, RD Date: 2/1/2019 Follow-up: 15 Mar @ 1030 Time: 10:40 AM

## 2019-02-25 PROCEDURE — 99282 EMERGENCY DEPT VISIT SF MDM: CPT

## 2019-02-26 ENCOUNTER — HOSPITAL ENCOUNTER (EMERGENCY)
Age: 34
Discharge: HOME | End: 2019-02-26
Attending: EMERGENCY MEDICINE
Payer: COMMERCIAL

## 2019-02-26 VITALS
WEIGHT: 190 LBS | HEART RATE: 78 BPM | RESPIRATION RATE: 20 BRPM | BODY MASS INDEX: 32.61 KG/M2 | TEMPERATURE: 98.8 F | DIASTOLIC BLOOD PRESSURE: 82 MMHG | SYSTOLIC BLOOD PRESSURE: 122 MMHG | OXYGEN SATURATION: 100 %

## 2019-02-26 DIAGNOSIS — J06.9 ACUTE URI: Primary | ICD-10-CM

## 2019-02-26 NOTE — LETTER
NOTIFICATION OF RETURN TO WORK / SCHOOL 
2/26/2019 Ms. Farnsworth [address] [facility] [place] [address] To Whom It May Concern: 
 
Yee Sosa was under the care of [facility] Emergency [facility] on 2/26/2019. She will return to work on 3/1/2019. If there are questions or concerns please have the patient contact our office. Sincerely, Amberly Hu MD

## 2019-02-26 NOTE — ED NOTES
Pt instructed to rest, hydrate, follow up with her PCP today, and to return for worsening fever/shortness of breath/other concerns. Pt lung sounds clear throughout; pt reports yellow phlegm/product. No distress noted/pt ambulatory to home.

## 2019-02-26 NOTE — ED PROVIDER NOTES
EMERGENCY DEPARTMENT HISTORY AND PHYSICAL EXAM 
 
12:18 AM 
 
 
Date: 2/26/2019 Patient Name: Carlin Garcia History of Presenting Illness Chief Complaint Patient presents with  Flu History Provided By: Patient Additional History (Context): Carlin Garcia is a 34 y.o. female with asthma who presents with fever, chills, body aches, congestion, sneezing, rhinorrhea, HA that started 2 days ago. The pt stated that she has been around kids who are sick at her work. Breathing treatment helped slightlly. She has also been taking tylenol regularly which has helped as well. The pt denies nausea, vomiting, abdominal pain, and SOB. PCP: Kat Amos NP Chief Complaint: Fever Duration:  Days Timing:  Worsening Location: HENT Quality: Aching Severity: Moderate Modifying Factors: Tylenol and breathing treatment have helped to alleviate the symptoms Associated Symptoms: HA, body aches, congestion, rhinorrhea, chills Current Outpatient Medications Medication Sig Dispense Refill  lxthdjgbhzzscw-[id number]-[id number] 10 mg- 0.0375 %-5 % CTPT cyclobenzaprine 10 mg tablet  fluticasone (FLOVENT HFA) 44 mcg/actuation inhaler Take 2 Puffs by inhalation two (2) times a day. 1 Inhaler 1  
 fluticasone (FLONASE) 50 mcg/actuation nasal spray 1 puff to each nare after nasal saline rinse before bedtime. 1 Bottle 1  
 PROAIR HFA 90 mcg/actuation inhaler INHALE 2 PUFFS BY MOUTH EVERY 4 HOURS AS NEEDED FOR WHEEZING 1 Inhaler 0  
 ondansetron hcl (ZOFRAN) 4 mg tablet Take 1 Tab by mouth every eight (8) hours as needed for Nausea. 12 Tab 0  
 alum-mag hydroxide-simeth (MYLANTA) 200-200-20 mg/5 mL susp Take 30 mL by mouth three (3) times daily (with meals). 1 Bottle 0  
 polyethylene glycol (MIRALAX) 17 gram/dose powder Take 17 g by mouth daily.  1 tablespoon with 8 oz of water daily (Patient taking differently: Take 17 g by mouth daily as needed. 1 tablespoon with 8 oz of water daily) 100 g 0  
 esomeprazole (NEXIUM) 40 mg capsule Take 1 Cap by mouth daily. 30 Cap 0  
 albuterol (PROVENTIL VENTOLIN) 2.5 mg /3 mL (0.083 %) nebulizer solution 3 mL by Nebulization route every four (4) hours as needed for Wheezing. 60 Each 0  
 amitriptyline (ELAVIL) 25 mg tablet Take 1 Tab by mouth nightly. 30 Tab 2  
 EPINEPHrine (EPIPEN) 0.3 mg/0.3 mL injection 0.3 mL by IntraMUSCular route once as needed. 1 Syringe 0  
 inhalational spacing device 1 Each by Does Not Apply route as needed. 1 Each 0 Past History Past Medical History: 
Past Medical History:  
Diagnosis Date  Asthma  Gastrointestinal disorder   
 ulcers  Ovarian cyst   
 Pneumonia  Ulcer Past Surgical History: 
Past Surgical History:  
Procedure Laterality Date  HX ENDOSCOPY  10/2018  HX ORTHOPAEDIC    
 bilateral knee surgery Family History: 
Family History Problem Relation Age of Onset  Diabetes Maternal Grandmother  Hypertension Father  Breast Cancer Paternal Grandmother  Cancer Neg Hx   
 Heart Disease Neg Hx  Stroke Neg Hx Social History: 
Social History Tobacco Use  Smoking status: Never Smoker  Smokeless tobacco: Never Used Substance Use Topics  Alcohol use: No  
  Frequency: Never  Drug use: No  
 
 
Allergies: Allergies Allergen Reactions  Nsaids (Non-Steroidal Anti-Inflammatory Drug) Nausea and Vomiting  Percocet [Oxycodone-Acetaminophen] Nausea and Vomiting  Tramadol Other (comments) Abdominal pain Review of Systems Review of Systems Constitutional: Positive for chills and fever. Negative for activity change and fatigue. HENT: Positive for congestion and rhinorrhea. Eyes: Negative for visual disturbance. Respiratory: Negative for shortness of breath. Cardiovascular: Negative for chest pain and palpitations. Gastrointestinal: Negative for abdominal pain, diarrhea, nausea and vomiting. Genitourinary: Negative for dysuria and hematuria. Musculoskeletal: Positive for myalgias. Negative for back pain. Skin: Negative for rash. Neurological: Positive for headaches. Negative for dizziness, weakness and light-headedness. Physical Exam  
 
Visit Vitals /82 (BP 1 Location: Left arm, BP Patient Position: At rest) Pulse 78 Temp 98.8 °F (37.1 °C) Resp 20 Wt 86.2 kg (190 lb) SpO2 100% BMI 32.61 kg/m² Physical Exam  
Constitutional: She is oriented to person, place, and time. She appears well-developed and well-nourished. No distress. HENT:  
Head: Normocephalic and atraumatic. Right Ear: External ear normal.  
Left Ear: External ear normal.  
Nose: Nose normal.  
Mouth/Throat: Oropharynx is clear and moist.  
Clear nasal discharge Eyes: Conjunctivae and EOM are normal. Pupils are equal, round, and reactive to light. No scleral icterus. Neck: Normal range of motion. Neck supple. No JVD present. No tracheal deviation present. No thyromegaly present. Cardiovascular: Normal rate, regular rhythm, normal heart sounds and intact distal pulses. Exam reveals no gallop and no friction rub. No murmur heard. Pulmonary/Chest: Effort normal and breath sounds normal. She exhibits no tenderness. Abdominal: Soft. Bowel sounds are normal. She exhibits no distension. There is no tenderness. There is no rebound and no guarding. Musculoskeletal: Normal range of motion. She exhibits no edema or tenderness. Lymphadenopathy:  
  She has no cervical adenopathy. Neurological: She is alert and oriented to person, place, and time. No cranial nerve deficit. Coordination normal.  
Skin: Skin is warm and dry. Psychiatric: She has a normal mood and affect. Her behavior is normal. Judgment and thought content normal.  
Nursing note and vitals reviewed. Diagnostic Study Results Labs - 
 No results found for this or any previous visit (from the past 12 hour(s)). Radiologic Studies - No orders to display Medical Decision Making It should be noted that Mono Chi MD will be the provider of record for this patient. I reviewed the vital signs, available nursing notes, past medical history, past surgical history, family history and social history. Vital Signs-Reviewed the patient's vital signs. Pulse Oximetry Analysis -  100 on room air Cardiac Monitor: 
Rate: 78 Records Reviewed: Nursing Notes and Old Medical Records (Time of Review: 12:18 AM) ED Course: Progress Notes, Reevaluation, and Consults: 
 
Provider Notes (Medical Decision Making): Mom agrees with dispo and F/U plan. Cameron Johnson MD 
12:29 AM 
 
 
 
 
Diagnosis Clinical Impression: No diagnosis found. Disposition: Discharge Follow-up Information None Medication List  
  
ASK your doctor about these medications * albuterol 2.5 mg /3 mL (0.083 %) nebulizer solution Commonly known as:  PROVENTIL VENTOLIN 
3 mL by Nebulization route every four (4) hours as needed for Wheezing. * PROAIR HFA 90 mcg/actuation inhaler Generic drug:  albuterol INHALE 2 PUFFS BY MOUTH EVERY 4 HOURS AS NEEDED FOR WHEEZING 
  
alum-mag hydroxide-simeth 200-200-20 mg/5 mL Susp Commonly known as:  MYLANTA Take 30 mL by mouth three (3) times daily (with meals). amitriptyline 25 mg tablet Commonly known as:  ELAVIL Take 1 Tab by mouth nightly. wqsxqypxuutpzt-pfjcbs-ojyet 10 mg- 0.0375 %-5 % Ctpt EPINEPHrine 0.3 mg/0.3 mL injection Commonly known as:  EPIPEN 
0.3 mL by IntraMUSCular route once as needed. esomeprazole 40 mg capsule Commonly known as:  NexIUM Take 1 Cap by mouth daily. * fluticasone 44 mcg/actuation inhaler Commonly known as:  FLOVENT HFA Take 2 Puffs by inhalation two (2) times a day. * fluticasone 50 mcg/actuation nasal spray Commonly known as:  FLONASE 
1 puff to each nare after nasal saline rinse before bedtime. inhalational spacing device 1 Each by Does Not Apply route as needed. ondansetron hcl 4 mg tablet Commonly known as:  Nolia Folk Take 1 Tab by mouth every eight (8) hours as needed for Nausea. polyethylene glycol 17 gram/dose powder Commonly known as:  Rikki [place] Take 17 g by mouth daily. 1 tablespoon with 8 oz of water daily * This list has 4 medication(s) that are the same as other medications prescribed for you. Read the directions carefully, and ask your doctor or other care provider to review them with you.  
  
  
  
 
_______________________________ Scribe Attestation Lizette Ponce acting as a scribe for and in the presence of Ming Meraz MD     
February 26, 2019 at 12:18 AM 
    
Provider Attestation:     
I personally performed the services described in the documentation, reviewed the documentation, as recorded by the scribe in my presence, and it accurately and completely records my words and actions. February 26, 2019 at 12:18 AM - Ming Meraz MD   
 
 
_______________________________

## 2019-02-26 NOTE — DISCHARGE INSTRUCTIONS
Patient Education        Viral Respiratory Infection: Care Instructions  Your Care Instructions    Viruses are very small organisms. They grow in number after they enter your body. There are many types that cause different illnesses, such as colds and the mumps. The symptoms of a viral respiratory infection often start quickly. They include a fever, sore throat, and runny nose. You may also just not feel well. Or you may not want to eat much. Most viral respiratory infections are not serious. They usually get better with time and self-care. Antibiotics are not used to treat a viral infection. That's because antibiotics will not help cure a viral illness. In some cases, antiviral medicine can help your body fight a serious viral infection. Follow-up care is a key part of your treatment and safety. Be sure to make and go to all appointments, and call your doctor if you are having problems. It's also a good idea to know your test results and keep a list of the medicines you take. How can you care for yourself at home? · Rest as much as possible until you feel better. · Be safe with medicines. Take your medicine exactly as prescribed. Call your doctor if you think you are having a problem with your medicine. You will get more details on the specific medicine your doctor prescribes. · Take an over-the-counter pain medicine, such as acetaminophen (Tylenol), ibuprofen (Advil, Motrin), or naproxen (Aleve), as needed for pain and fever. Read and follow all instructions on the label. Do not give aspirin to anyone younger than 20. It has been linked to Reye syndrome, a serious illness. · Drink plenty of fluids, enough so that your urine is light yellow or clear like water. Hot fluids, such as tea or soup, may help relieve congestion in your nose and throat. If you have kidney, heart, or liver disease and have to limit fluids, talk with your doctor before you increase the amount of fluids you drink.   · Try to clear mucus from your lungs by breathing deeply and coughing. · Gargle with warm salt water once an hour. This can help reduce swelling and throat pain. Use 1 teaspoon of salt mixed in 1 cup of warm water. · Do not smoke or allow others to smoke around you. If you need help quitting, talk to your doctor about stop-smoking programs and medicines. These can increase your chances of quitting for good. To avoid spreading the virus  · Cough or sneeze into a tissue. Then throw the tissue away. · If you don't have a tissue, use your hand to cover your cough or sneeze. Then clean your hand. You can also cough into your sleeve. · Wash your hands often. Use soap and warm water. Wash for 15 to 20 seconds each time. · If you don't have soap and water near you, you can clean your hands with alcohol wipes or gel. When should you call for help? Call your doctor now or seek immediate medical care if:    · You have a new or higher fever.     · Your fever lasts more than 48 hours.     · You have trouble breathing.     · You have a fever with a stiff neck or a severe headache.     · You are sensitive to light.     · You feel very sleepy or confused.    Watch closely for changes in your health, and be sure to contact your doctor if:    · You do not get better as expected. Where can you learn more? Go to http://placido-willi.info/. Enter G667 in the search box to learn more about \"Viral Respiratory Infection: Care Instructions. \"  Current as of: September 5, 2018  Content Version: 11.9  © 9299-8435 [employer]. Care instructions adapted under license by [employer] (which disclaims liability or warranty for this information). If you have questions about a medical condition or this instruction, always ask your healthcare professional. [address] any warranty or liability for your use of this information.

## 2019-07-11 ENCOUNTER — OFFICE VISIT (OUTPATIENT)
Dept: INTERNAL MEDICINE CLINIC | Age: 34
End: 2019-07-11

## 2019-07-11 VITALS
WEIGHT: 193 LBS | BODY MASS INDEX: 32.95 KG/M2 | SYSTOLIC BLOOD PRESSURE: 114 MMHG | HEIGHT: 64 IN | OXYGEN SATURATION: 98 % | DIASTOLIC BLOOD PRESSURE: 56 MMHG | HEART RATE: 65 BPM | RESPIRATION RATE: 14 BRPM | TEMPERATURE: 98.2 F

## 2019-07-11 DIAGNOSIS — R73.03 PREDIABETES: Primary | ICD-10-CM

## 2019-07-11 DIAGNOSIS — R51.9 HEADACHE, CHRONIC DAILY: ICD-10-CM

## 2019-07-11 DIAGNOSIS — J06.9 VIRAL UPPER RESPIRATORY TRACT INFECTION: ICD-10-CM

## 2019-07-11 DIAGNOSIS — R05.9 COUGH: ICD-10-CM

## 2019-07-11 DIAGNOSIS — H61.23 BILATERAL IMPACTED CERUMEN: ICD-10-CM

## 2019-07-11 DIAGNOSIS — J45.901 EXACERBATION OF ASTHMA, UNSPECIFIED ASTHMA SEVERITY, UNSPECIFIED WHETHER PERSISTENT: ICD-10-CM

## 2019-07-11 LAB — HBA1C MFR BLD HPLC: 5.5 %

## 2019-07-11 RX ORDER — FLUTICASONE PROPIONATE 50 MCG
SPRAY, SUSPENSION (ML) NASAL
Qty: 1 BOTTLE | Refills: 1 | Status: SHIPPED | OUTPATIENT
Start: 2019-07-11 | End: 2021-02-16

## 2019-07-11 RX ORDER — AMITRIPTYLINE HYDROCHLORIDE 25 MG/1
25 TABLET, FILM COATED ORAL
Qty: 90 TAB | Refills: 3 | Status: SHIPPED | OUTPATIENT
Start: 2019-07-11 | End: 2021-02-16 | Stop reason: SDUPTHER

## 2019-07-11 RX ORDER — FLUTICASONE PROPIONATE 44 UG/1
2 AEROSOL, METERED RESPIRATORY (INHALATION) 2 TIMES DAILY
Qty: 1 INHALER | Refills: 1 | Status: SHIPPED | OUTPATIENT
Start: 2019-07-11 | End: 2021-02-16

## 2019-07-11 NOTE — PROGRESS NOTES
Fang Sosa presents today for   Chief Complaint   Patient presents with    Headache     reports intermittent sharp pain that shoots from front of head to back of head, started being more frequent x 1.5 months              Depression Screening:  3 most recent PHQ Screens 12/7/2018   Little interest or pleasure in doing things Not at all   Feeling down, depressed, irritable, or hopeless Not at all   Total Score PHQ 2 0       Learning Assessment:  Learning Assessment 11/14/2018   PRIMARY LEARNER Patient   HIGHEST LEVEL OF EDUCATION - PRIMARY LEARNER  -   BARRIERS PRIMARY LEARNER -   [facility]    NEED -   LEARNER PREFERENCE PRIMARY DEMONSTRATION   LEARNING SPECIAL TOPICS -   ANSWERED BY patient   RELATIONSHIP SELF   ASSESSMENT COMMENT -       Abuse Screening:  Abuse Screening Questionnaire 11/14/2018   Do you ever feel afraid of your partner? N   Are you in a relationship with someone who physically or mentally threatens you? N   Is it safe for you to go home? Y       Fall Risk  No flowsheet data found. Coordination of Care:  1. Have you been to the ER, urgent care clinic since your last visit? Hospitalized since your last visit? NO    2. Have you seen or consulted any other health care providers outside of the [address] [place] [address] since your last visit? Include any pap smears or colon screening.  NO

## 2019-07-11 NOTE — PROGRESS NOTES
Jerry Becker is a 34 y.o.  female and presents with    Chief Complaint   Patient presents with    Headache     reports intermittent sharp pain that shoots from front of head to back of head, started being more frequent x 1.5 months       Subjective:  HPI   She presents today with complaints of headache. She is with history of tension HA. She is taking amitriptyline (Elavil) for maintenance therapy with control. She reports ran out of the medication about 1 month ago. . She has been getting sharp pains to the frontal lobe and radiating to the back of the head mostly to the right side of the head and still with frequent headaches that started about 2 weeks ago after running out of Elavil. Occurring randomly. She denies URI symptoms, still only using Albuterol at most 1-2 times a week, denies change in vision, speech, hearing, weakness. She does report appetite changes not eating as much and tired. She reports has to stop when the pain comes. She is working as a mental health tech, changed hours from 2-11 or 11-7 now 6-3 or 3-11 just changed 2 weeks ago. Sleep hygiene she reports goes to bed at same time but does not get to sleep until 1-2 am. She reports drinking more water, appetite changes. Increased stress at work and takes care of elderly family member. Weight is stable. BP is good. She has a history of IGE medicated Asthma. She is not currently under the care of a pulmonologist, allergist, or ENT. She is using Albuterol 1-2 times weekly and Nebulizer treatments and using flovent daily, she is using the Flonase. She reports past use of Flovent, Advair- Not current. Denies hx of smoking. She was with an allergist in the past. She declines history of allergy shots. She has an epi pen.     She is with a history of chronic abdominal pain and obesity. She saw Dr. Jessica Sutton, gastroenterology, in the past. On 10/4/18 Lipase, CMP, CBC, and Hcg were negative. HIV 10/18 non-reactive.  Past work up with SED rate, CRP, SLE profile, RF, CCP ab, JABARI, Hep C, and RPR all negative. TSH 2016 normal. CT abd/pelvis 2016 No acute findings to explain abdominal pain except for moderate stool burden, Increased size of a left left greater than right lower vaginal wall cysts most compatible with Bartholin's gland cysts. She had an US abdomen 10/2016 showed Mildly increased hepatic echogenicity, may be seen with hepatic steatosis or other intrinsic liver disease.  No solid liver mass.  No biliary ductal dilatation.  Gallbladder within normal limits. The hepatobiliary Scan 10/2016 showed negative cystic duct obstruction, delayed hepatic to small bowel transit. Xray abd 10/2018 showed increase stool in colon otherwise negative. EGD performed per patient recently however I do not see this report. She is taking Nexium and Mylanta, Zofran, Miralax.      She is with history of tension HA. She is taking amitriptyline (Elavil) for maintenance therapy with control. Uses epi pen for food allergies- bananas, muchrooms, pollen, trees. Will call for refill if  epi pen. Additional Concerns: none     ROS   Review of Systems   Neurological: Positive for headaches. Negative for dizziness, tingling, sensory change, speech change, focal weakness and weakness. Allergies   Allergen Reactions    Nsaids (Non-Steroidal Anti-Inflammatory Drug) Nausea and Vomiting    Percocet [Oxycodone-Acetaminophen] Nausea and Vomiting    Tramadol Other (comments)     Abdominal pain       Current Outpatient Medications   Medication Sig Dispense Refill    amitriptyline (ELAVIL) 25 mg tablet Take 1 Tab by mouth nightly. 90 Tab 3    fluticasone propionate (FLONASE) 50 mcg/actuation nasal spray 1 puff to each nare after nasal saline rinse before bedtime. Indications: inflammation of the nose due to an allergy 1 Bottle 1    fluticasone propionate (FLOVENT HFA) 44 mcg/actuation inhaler Take 2 Puffs by inhalation two (2) times a day. 1 Inhaler 1    PROAIR HFA 90 mcg/actuation inhaler INHALE 2 PUFFS BY MOUTH EVERY 4 HOURS AS NEEDED FOR WHEEZING 1 Inhaler 0    alum-mag hydroxide-simeth (MYLANTA) 200-200-20 mg/5 mL susp Take 30 mL by mouth three (3) times daily (with meals). 1 Bottle 0    polyethylene glycol (MIRALAX) 17 gram/dose powder Take 17 g by mouth daily. 1 tablespoon with 8 oz of water daily (Patient taking differently: Take 17 g by mouth daily as needed. 1 tablespoon with 8 oz of water daily) 100 g 0    esomeprazole (NEXIUM) 40 mg capsule Take 1 Cap by mouth daily. 30 Cap 0    albuterol (PROVENTIL VENTOLIN) 2.5 mg /3 mL (0.083 %) nebulizer solution 3 mL by Nebulization route every four (4) hours as needed for Wheezing. 60 Each 0    EPINEPHrine (EPIPEN) 0.3 mg/0.3 mL injection 0.3 mL by IntraMUSCular route once as needed. 1 Syringe 0    inhalational spacing device 1 Each by Does Not Apply route as needed.  1 Each 0       Social History     Socioeconomic History    Marital status: SINGLE     Spouse name: Not on file    Number of children: Not on file    Years of education: Not on file    Highest education level: Not on file   Occupational History    Not on file   Social Needs    Financial resource strain: Not on file    Food insecurity:     Worry: Not on file     Inability: Not on file    Transportation needs:     Medical: Not on file     Non-medical: Not on file   Tobacco Use    Smoking status: Never Smoker    Smokeless tobacco: Never Used   Substance and Sexual Activity    Alcohol use: No     Frequency: Never    Drug use: No    Sexual activity: Yes     Partners: Female   Lifestyle    Physical activity:     Days per week: Not on file     Minutes per session: Not on file    Stress: Not on file   Relationships    Social connections:     Talks on phone: Not on file     Gets together: Not on file     Attends Hinduism service: Not on file     Active member of club or organization: Not on file     Attends meetings of clubs or organizations: Not on file     Relationship status: Not on file    Intimate partner violence:     Fear of current or ex partner: Not on file     Emotionally abused: Not on file     Physically abused: Not on file     Forced sexual activity: Not on file   Other Topics Concern    Not on file   Social History Narrative    Not on file       Past Medical History:   Diagnosis Date    Asthma     Gastrointestinal disorder     ulcers    Ovarian cyst     Pneumonia     Ulcer        Past Surgical History:   Procedure Laterality Date    HX ENDOSCOPY  10/2018    HX ORTHOPAEDIC      bilateral knee surgery       Family History   Problem Relation Age of Onset    Diabetes Maternal Grandmother     Hypertension Father     Breast Cancer Paternal Grandmother     Cancer Neg Hx     Heart Disease Neg Hx     Stroke Neg Hx        Objective:  Vitals:    07/11/19 0832   BP: 114/56   Pulse: 65   Resp: 14   Temp: 98.2 °F (36.8 °C)   TempSrc: Oral   SpO2: 98%   Weight: 193 lb (87.5 kg)   Height: 5' 4\" (1.626 m)   PainSc:   0 - No pain       LABS   Results for orders placed or performed in visit on 07/11/19   AMB POC HEMOGLOBIN A1C   Result Value Ref Range    Hemoglobin A1c (POC) 5.5 %       TESTS  none    PE  Physical Exam   Constitutional: She is oriented to person, place, and time. She appears well-developed and well-nourished. HENT:   Head: Normocephalic and atraumatic. Cardiovascular: Normal rate, regular rhythm, normal heart sounds and intact distal pulses. Pulmonary/Chest: Effort normal and breath sounds normal.   Musculoskeletal: Normal range of motion. She exhibits no edema or tenderness. Neurological: She is alert and oriented to person, place, and time. Skin: Skin is warm and dry. Psychiatric: She has a normal mood and affect. Her behavior is normal. Judgment and thought content normal.   Vitals reviewed. Assessment/Plan:    1.  Acute on chronic HA/insomnia- refilled Elavil, feel the acute episodes is without other assoc. neurological symptoms, is related to stress. A1C level today 5.5. Work on hydration, limit carbohydrates and exercise. Proper amount of sleep and sleep hygiene. States failed Melatonin. Report if continues to worsen or changes. 2. CPE due in 11/2019. Labs then    3. Chronic cerumen impaction- ref ENT. Lab review: orders written for new lab studies as appropriate; see orders    Today's Visit:   Diagnoses and all orders for this visit:    1. Prediabetes  -     AMB POC HEMOGLOBIN A1C    2. Headache, chronic daily  -     amitriptyline (ELAVIL) 25 mg tablet; Take 1 Tab by mouth nightly. 3. Cough  -     fluticasone propionate (FLONASE) 50 mcg/actuation nasal spray; 1 puff to each nare after nasal saline rinse before bedtime. Indications: inflammation of the nose due to an allergy    4. Viral upper respiratory tract infection  -     fluticasone propionate (FLONASE) 50 mcg/actuation nasal spray; 1 puff to each nare after nasal saline rinse before bedtime. Indications: inflammation of the nose due to an allergy    5. Exacerbation of asthma, unspecified asthma severity, unspecified whether persistent  -     fluticasone propionate (FLOVENT HFA) 44 mcg/actuation inhaler; Take 2 Puffs by inhalation two (2) times a day. 6. Bilateral impacted cerumen  -     REFERRAL TO ENT-OTOLARYNGOLOGY      Health Maintenance: [address] in future, handout provided. I have discussed the diagnosis with the patient and the intended plan as seen in the above orders. The patient has received an after-visit summary and questions were answered concerning future plans. I have discussed medication side effects and warnings with the patient as well. I have reviewed the plan of care with the patient, accepted their input and they are in agreement with the treatment goals. More than 1/2 of this 25 minute visit was spent in counseling and coordination of care, as described above.     Maite Mcdonough, JYOTI  Internist of [address] [place] [address], [facility] [address]  Phone: [phone]  Fax: [phone]

## 2019-07-11 NOTE — PATIENT INSTRUCTIONS
Pneumococcal Polysaccharide Vaccine: What You Need to Know  Why get vaccinated? Vaccination can protect older adults (and some children and younger adults) from pneumococcal disease. Pneumococcal disease is caused by bacteria that can spread from person to person through close contact. It can cause ear infections, and it can also lead to more serious infections of the:  · Lungs (pneumonia),  · Blood (bacteremia), and  · Covering of the brain and spinal cord (meningitis). Meningitis can cause deafness and brain damage, and it can be fatal.  Anyone can get pneumococcal disease, but children under 3years of age, people with certain medical conditions, adults over 72years of age, and cigarette smokers are at the highest risk. About 18,000 older adults die each year from pneumococcal disease in the [place]. Treatment of pneumococcal infections with penicillin and other drugs used to be more effective. But some strains of the disease have become resistant to these drugs. This makes prevention of the disease, through vaccination, even more important. Pneumococcal polysaccharide vaccine (PPSV23)  Pneumococcal polysaccharide vaccine (PPSV23) protects against 23 types of pneumococcal bacteria. It will not prevent all pneumococcal disease. PPSV23 is recommended for:  · All adults 72years of age and older,  · Anyone 2 through 59years of age with certain long-term health problems,  · Anyone 2 through 59years of age with a weakened immune system,  · Adults 23 through 59years of age who smoke cigarettes or have asthma. Most people need only one dose of PPSV. A second dose is recommended for certain high-risk groups. People 72 and older should get a dose even if they have gotten one or more doses of the vaccine before they turned 65. Your healthcare provider can give you more information about these recommendations. Most healthy adults develop protection within 2 to 3 weeks of getting the shot.   Some people should not get this vaccine  · Anyone who has had a life-threatening allergic reaction to PPSV should not get another dose. · Anyone who has a severe allergy to any component of PPSV should not receive it. Tell your provider if you have any severe allergies. · Anyone who is moderately or severely ill when the shot is scheduled may be asked to wait until they recover before getting the vaccine. Someone with a mild illness can usually be vaccinated. · Children less than 3years of age should not receive this vaccine. · There is no evidence that PPSV is harmful to either a pregnant woman or to her fetus. However, as a precaution, women who need the vaccine should be vaccinated before becoming pregnant, if possible. Risks of a vaccine reaction  With any medicine, including vaccines, there is a chance of side effects. These are usually mild and go away on their own, but serious reactions are also possible. About half of people who get PPSV have mild side effects, such as redness or pain where the shot is given, which go away within about two days. Less than 1 out of 100 people develop a fever, muscle aches, or more severe local reactions. Problems that could happen after any vaccine:  · People sometimes faint after a medical procedure, including vaccination. Sitting or lying down for about 15 minutes can help prevent fainting, and injuries caused by a fall. Tell your doctor if you feel dizzy, or have vision changes or ringing in the ears. · Some people get severe pain in the shoulder and have difficulty moving the arm where a shot was given. This happens very rarely. · Any medication can cause a severe allergic reaction. Such reactions from a vaccine are very rare, estimated at about 1 in a million doses, and would happen within a few minutes to a few hours after the vaccination. As with any medicine, there is a very remote chance of a vaccine causing a serious injury or death.   The safety of vaccines is always being monitored. For more information, visit: www.cdc.gov/vaccinesafety/  What if there is a serious reaction? What should I look for? Look for anything that concerns you, such as signs of a severe allergic reaction, very high fever, or unusual behavior. Signs of a severe allergic reaction can include hives, swelling of the face and throat, difficulty breathing, a fast heartbeat, dizziness, and weakness. These would usually start a few minutes to a few hours after the vaccination. What should I do? If you think it is a severe allergic reaction or other emergency that can't wait, call 9-1-1 or get to the nearest hospital. Otherwise, call your doctor. Afterward, the reaction should be reported to the Vaccine Adverse Event Reporting System (VAERS). Your doctor might file this report, or you can do it yourself through the VAERS web site at www.vaers. Movity.gov, or by calling 5-638.886.9967. [employer] does not give medical advice. How can I learn more? · Ask your doctor. He or she can give you the vaccine package insert or suggest other sources of information. · Call your local or state health department. · Contact the Centers for Disease Control and Prevention (CDC):  ? Call 7-657.205.9669 (1-800-CDC-INFO) or  ? Visit CDC's website at www.cdc.gov/vaccines  Vaccine Information Statement  PPSV Vaccine  (04/24/2015)  Department of Health and Human Services  Centers for Disease Control and Prevention  Many Vaccine Information Statements are available in [place] and other languages. See www.immunize.org/vis. Hojas de información Sobre Vacunas están disponibles en español y en muchos otros idiomas. Visite Ursula.si. Care instructions adapted under license by [employer] (which disclaims liability or warranty for this information).  If you have questions about a medical condition or this instruction, always ask your healthcare professional. Jeremias Lawrence any warranty or liability for your use of this information.

## 2019-10-28 ENCOUNTER — OFFICE VISIT (OUTPATIENT)
Dept: INTERNAL MEDICINE CLINIC | Age: 34
End: 2019-10-28

## 2019-10-28 VITALS
RESPIRATION RATE: 18 BRPM | HEIGHT: 64 IN | HEART RATE: 75 BPM | DIASTOLIC BLOOD PRESSURE: 68 MMHG | OXYGEN SATURATION: 99 % | WEIGHT: 204 LBS | SYSTOLIC BLOOD PRESSURE: 115 MMHG | TEMPERATURE: 97.7 F | BODY MASS INDEX: 34.83 KG/M2

## 2019-10-28 DIAGNOSIS — R50.9 FEVER, UNSPECIFIED FEVER CAUSE: ICD-10-CM

## 2019-10-28 DIAGNOSIS — J01.90 ACUTE NON-RECURRENT SINUSITIS, UNSPECIFIED LOCATION: Primary | ICD-10-CM

## 2019-10-28 PROBLEM — R73.03 PREDIABETES: Status: ACTIVE | Noted: 2019-10-28

## 2019-10-28 PROBLEM — K75.81 NASH (NONALCOHOLIC STEATOHEPATITIS): Status: ACTIVE | Noted: 2019-10-28

## 2019-10-28 RX ORDER — AMOXICILLIN AND CLAVULANATE POTASSIUM 875; 125 MG/1; MG/1
1 TABLET, FILM COATED ORAL EVERY 12 HOURS
Qty: 14 TAB | Refills: 0 | Status: SHIPPED | OUTPATIENT
Start: 2019-10-28 | End: 2019-11-04

## 2019-10-28 NOTE — PROGRESS NOTES
INTERNISTS OF [facility]:  10/28/2019, MRN: [id number]      Marylen Loan is a 30 y.o. female and presents to clinic for Establish Care (ROOM 2); Cold Symptoms (x 1 week); Ear Pain (Right); Nasal Discharge (green nasal discharge); Generalized Body Aches; and Chills    Subjective: The pt is a 35yo female with h/o obesity GERD/PUD, asthma, and allergic rhinitis. Respiratory Tract Infection: She reports a one-week history of right ear pain, right ear pain,chest tightness, rhinorrhea with green discharge, myalgias, and chills. She received a care facility and was sent home today given her symptoms. She is afebrile here. Alleviating factors: tylenol. Her temp was 102 on Saturday. Her temp was 100.3 this morning. She uses flovent and albuterol. Patient Active Problem List    Diagnosis Date Noted    Prediabetes 10/28/2019    FLAHERTY (nonalcoholic steatohepatitis) 10/28/2019    Severe obesity (BMI 35.0-39.9) 09/06/2018    Chronic constipation 11/23/2016    Chronic tension-type headache, not intractable 11/23/2016    Asthma 05/02/2016    PUD (peptic ulcer disease) 05/02/2016       Current Outpatient Medications   Medication Sig Dispense Refill    amitriptyline (ELAVIL) 25 mg tablet Take 1 Tab by mouth nightly. 90 Tab 3    fluticasone propionate (FLONASE) 50 mcg/actuation nasal spray 1 puff to each nare after nasal saline rinse before bedtime. Indications: inflammation of the nose due to an allergy 1 Bottle 1    fluticasone propionate (FLOVENT HFA) 44 mcg/actuation inhaler Take 2 Puffs by inhalation two (2) times a day. 1 Inhaler 1    PROAIR HFA 90 mcg/actuation inhaler INHALE 2 PUFFS BY MOUTH EVERY 4 HOURS AS NEEDED FOR WHEEZING 1 Inhaler 0    alum-mag hydroxide-simeth (MYLANTA) 200-200-20 mg/5 mL susp Take 30 mL by mouth three (3) times daily (with meals). 1 Bottle 0    polyethylene glycol (MIRALAX) 17 gram/dose powder Take 17 g by mouth daily.  1 tablespoon with 8 oz of water daily (Patient taking differently: Take 17 g by mouth daily as needed. 1 tablespoon with 8 oz of water daily) 100 g 0    esomeprazole (NEXIUM) 40 mg capsule Take 1 Cap by mouth daily. 30 Cap 0    albuterol (PROVENTIL VENTOLIN) 2.5 mg /3 mL (0.083 %) nebulizer solution 3 mL by Nebulization route every four (4) hours as needed for Wheezing. 60 Each 0    EPINEPHrine (EPIPEN) 0.3 mg/0.3 mL injection 0.3 mL by IntraMUSCular route once as needed. 1 Syringe 0    inhalational spacing device 1 Each by Does Not Apply route as needed. 1 Each 0       Allergies   Allergen Reactions    Nsaids (Non-Steroidal Anti-Inflammatory Drug) Nausea and Vomiting    Percocet [Oxycodone-Acetaminophen] Nausea and Vomiting    Tramadol Other (comments)     Abdominal pain       Past Medical History:   Diagnosis Date    Asthma     Gastrointestinal disorder     ulcers    Ovarian cyst     Pneumonia     Ulcer        Past Surgical History:   Procedure Laterality Date    HX ENDOSCOPY  10/2018    HX ORTHOPAEDIC      bilateral knee surgery       Family History   Problem Relation Age of Onset    Diabetes Maternal Grandmother     Hypertension Father     Breast Cancer Paternal Grandmother     Cancer Neg Hx     Heart Disease Neg Hx     Stroke Neg Hx        Social History     Tobacco Use    Smoking status: Never Smoker    Smokeless tobacco: Never Used   Substance Use Topics    Alcohol use: No     Frequency: Never       ROS   Review of Systems   Constitutional: Positive for fever and malaise/fatigue. Negative for chills and weight loss. HENT: Positive for congestion and ear pain. Negative for sore throat. Eyes: Negative for blurred vision and pain. Respiratory: Positive for cough, sputum production and shortness of breath. Cardiovascular: Negative for chest pain. Gastrointestinal: Negative for abdominal pain, blood in stool and melena. Genitourinary: Negative for dysuria and hematuria. Musculoskeletal: Positive for myalgias.  Negative for joint pain. Skin: Negative for rash. Neurological: Negative for tingling, focal weakness and headaches. Endo/Heme/Allergies: Does not bruise/bleed easily. Psychiatric/Behavioral: Negative for substance abuse. Objective     Vitals:    10/28/19 1122   BP: 115/68   Pulse: 75   Resp: 18   Temp: 97.7 °F (36.5 °C)   TempSrc: Oral   SpO2: 99%   Weight: 204 lb (92.5 kg)   Height: 5' 4\" (1.626 m)   PainSc:   2   PainLoc: Ear   LMP: 10/06/2019       Physical Exam   Constitutional: She is oriented to person, place, and time and well-developed, well-nourished, and in no distress. HENT:   Head: Normocephalic and atraumatic. Mouth/Throat: Oropharynx is clear and moist. No oropharyngeal exudate. +Nasal congestion. Sinus areas are nontender to palpation. Cerumen in b/l external auditory canals   Eyes: Pupils are equal, round, and reactive to light. Conjunctivae and EOM are normal. Right eye exhibits no discharge. Left eye exhibits no discharge. No scleral icterus. Neck: Neck supple. Cardiovascular: Normal rate, regular rhythm, normal heart sounds and intact distal pulses. Exam reveals no gallop and no friction rub. No murmur heard. Pulmonary/Chest: Effort normal and breath sounds normal. No respiratory distress. She has no wheezes. She has no rales. Abdominal: Soft. Bowel sounds are normal. She exhibits no distension. There is no tenderness. There is no rebound and no guarding. Musculoskeletal: She exhibits no edema or tenderness (BUE). Lymphadenopathy:     She has no cervical adenopathy. Neurological: She is alert and oriented to person, place, and time. She exhibits normal muscle tone. Gait normal.   Skin: Skin is warm and dry. No erythema. Psychiatric: Affect normal.   Nursing note and vitals reviewed.       LABS   Data Review:   Lab Results   Component Value Date/Time    WBC 4.6 11/14/2018 02:56 PM    HGB 13.8 11/14/2018 02:56 PM    HCT 42.4 11/14/2018 02:56 PM    PLATELET 777 01/10/8798 02:56 PM    MCV 87.1 11/14/2018 02:56 PM       Lab Results   Component Value Date/Time    Sodium 139 11/14/2018 02:56 PM    Potassium 4.2 11/14/2018 02:56 PM    Chloride 105 11/14/2018 02:56 PM    CO2 26 11/14/2018 02:56 PM    Anion gap 8 11/14/2018 02:56 PM    Glucose 99 11/14/2018 02:56 PM    BUN 8 11/14/2018 02:56 PM    Creatinine 0.81 11/14/2018 02:56 PM    BUN/Creatinine ratio 10 (L) 11/14/2018 02:56 PM    GFR est AA >60 11/14/2018 02:56 PM    GFR est non-AA >60 11/14/2018 02:56 PM    Calcium 9.0 11/14/2018 02:56 PM       No results found for: CHOL, CHOLPOCT, CHOLX, CHLST, CHOLV, HDL, HDLPOC, HDLP, LDL, LDLCPOC, LDLC, DLDLP, VLDLC, VLDL, TGLX, TRIGL, TRIGP, TGLPOCT, CHHD, CHHDX    Lab Results   Component Value Date/Time    Hemoglobin A1c 5.8 (H) 11/14/2018 02:56 PM    Hemoglobin A1c (POC) 5.5 07/11/2019 10:26 AM       Assessment/Plan:   Respiratory Tract Infection: Per PE findings and history, I will treat her for presumed sinusitis. She is rapid flu test negative.  Rest.  Hydration with water.  Prescribing a course of Augmentin given her congestion symptoms and fever. Work note written today. I encouraged her to use Debrox over-the-counter for cerumen impaction bilaterally. Health Maintenance Due   Topic Date Due    Pneumococcal 0-64 years (1 of 1 - PPSV23) 08/15/1993    Influenza Age 5 to Adult  08/01/2019     Lab review: labs are reviewed in the EHR    I have discussed the diagnosis with the patient and the intended plan as seen in the above orders. The patient has received an after-visit summary and questions were answered concerning future plans. I have discussed medication side effects and warnings with the patient as well. I have reviewed the plan of care with the patient, accepted their input and they are in agreement with the treatment goals. All questions were answered. The patient understands the plan of care. Handouts provided today with above information.  Pt instructed if symptoms worsen to call the office or report to the ED for continued care. Greater than 50% of the visit time was spent in counseling and/or coordination of care. Voice recognition was used to generate this report, which may have resulted in some phonetic based errors in grammar and contents. Even though attempts were made to correct all the mistakes, some may have been missed, and remained in the body of the document.           Shelbi Dillard MD

## 2019-10-28 NOTE — LETTER
NOTIFICATION OF RETURN TO WORK / SCHOOL 
 
10/28/2019 Ms. Gay [address] [facility] [id number] To Whom It May Concern: 
 
Salvador Sosa is under my medical care. Please excuse her absence from work 10/28/19. She will return to work on 10/29/19 with regular duties and/or activities. If there are questions or concerns please have the patient contact our office.  
 
 
 
Sincerely, 
 
 
 
 
 
 
Lucius Arias MD

## 2019-10-28 NOTE — PROGRESS NOTES
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 1. Have you been to the ER, urgent care clinic since your last visit? Hospitalized since your last visit? No    2. Have you seen or consulted any other health care providers outside of the [address] [place] [address] since your last visit? Include any pap smears or colon screening. No    Patient was given a copy of the Advanced Directive and understands to bring it in once completed.

## 2019-10-28 NOTE — PATIENT INSTRUCTIONS
Cough: Care Instructions Your Care Instructions A cough is your body's response to something that bothers your throat or airways. Many things can cause a cough. You might cough because of a cold or the flu, bronchitis, or asthma. Smoking, postnasal drip, allergies, and stomach acid that backs up into your throat also can cause coughs. A cough is a symptom, not a disease. Most coughs stop when the cause, such as a cold, goes away. You can take a few steps at home to cough less and feel better. Follow-up care is a key part of your treatment and safety. Be sure to make and go to all appointments, and call your doctor if you are having problems. It's also a good idea to know your test results and keep a list of the medicines you take. How can you care for yourself at home? · Drink lots of water and other fluids. This helps thin the mucus and soothes a dry or sore throat. Honey or lemon juice in hot water or tea may ease a dry cough. · Take cough medicine as directed by your doctor. · Prop up your head on pillows to help you breathe and ease a dry cough. · Try cough drops to soothe a dry or sore throat. Cough drops don't stop a cough. Medicine-flavored cough drops are no better than candy-flavored drops or hard candy. · Do not smoke. Avoid secondhand smoke. If you need help quitting, talk to your doctor about stop-smoking programs and medicines. These can increase your chances of quitting for good. When should you call for help? Call 911 anytime you think you may need emergency care.  For example, call if: 
  · You have severe trouble breathing.  
 Call your doctor now or seek immediate medical care if: 
  · You cough up blood.  
  · You have new or worse trouble breathing.  
  · You have a new or higher fever.  
  · You have a new rash.  
 Watch closely for changes in your health, and be sure to contact your doctor if: 
  · You cough more deeply or more often, especially if you notice more mucus or a change in the color of your mucus.  
  · You have new symptoms, such as a sore throat, an earache, or sinus pain.  
  · You do not get better as expected. Where can you learn more? Go to http://placido-willi.info/. Enter D279 in the search box to learn more about \"Cough: Care Instructions. \" Current as of: June 9, 2019 Content Version: 12.2 © 0248-0498 [employer], [email or web address]. Care instructions adapted under license by [employer] (which disclaims liability or warranty for this information). If you have questions about a medical condition or this instruction, always ask your healthcare professional. [address] any warranty or liability for your use of this information.

## 2019-12-12 ENCOUNTER — APPOINTMENT (OUTPATIENT)
Dept: GENERAL RADIOLOGY | Age: 34
End: 2019-12-12
Attending: PHYSICIAN ASSISTANT
Payer: COMMERCIAL

## 2019-12-12 ENCOUNTER — HOSPITAL ENCOUNTER (EMERGENCY)
Age: 34
Discharge: HOME | End: 2019-12-12
Attending: EMERGENCY MEDICINE
Payer: COMMERCIAL

## 2019-12-12 VITALS
RESPIRATION RATE: 20 BRPM | OXYGEN SATURATION: 98 % | HEIGHT: 64 IN | HEART RATE: 126 BPM | DIASTOLIC BLOOD PRESSURE: 85 MMHG | SYSTOLIC BLOOD PRESSURE: 132 MMHG | WEIGHT: 205 LBS | BODY MASS INDEX: 35 KG/M2 | TEMPERATURE: 98.5 F

## 2019-12-12 DIAGNOSIS — B34.9 VIRAL ILLNESS: Primary | ICD-10-CM

## 2019-12-12 DIAGNOSIS — J18.9 PNEUMONIA OF RIGHT LUNG DUE TO INFECTIOUS ORGANISM, UNSPECIFIED PART OF LUNG: ICD-10-CM

## 2019-12-12 PROCEDURE — 71046 X-RAY EXAM CHEST 2 VIEWS: CPT

## 2019-12-12 PROCEDURE — 99282 EMERGENCY DEPT VISIT SF MDM: CPT

## 2019-12-12 RX ORDER — DEXAMETHASONE SODIUM PHOSPHATE 4 MG/ML
10 INJECTION, SOLUTION INTRA-ARTICULAR; INTRALESIONAL; INTRAMUSCULAR; INTRAVENOUS; SOFT TISSUE
Status: DISCONTINUED | OUTPATIENT
Start: 2019-12-12 | End: 2019-12-12

## 2019-12-12 RX ORDER — DOXYCYCLINE HYCLATE 100 MG
100 TABLET ORAL 2 TIMES DAILY
Qty: 14 TAB | Refills: 0 | Status: SHIPPED | OUTPATIENT
Start: 2019-12-12 | End: 2019-12-19

## 2019-12-12 NOTE — PROGRESS NOTES
Carlon Brunner presents today for   Chief Complaint   Patient presents with    ED Follow-up     Seen at ER on 12-9-19 treated with tamiflu, went to ER again on 12-12-19 treated for pneumonia. Depression Screening:  3 most recent PHQ Screens 10/28/2019   Little interest or pleasure in doing things Not at all   Feeling down, depressed, irritable, or hopeless Not at all   Total Score PHQ 2 0       Learning Assessment:  Learning Assessment 10/28/2019   PRIMARY LEARNER Patient   HIGHEST LEVEL OF EDUCATION - PRIMARY LEARNER  2 YEARS OF COLLEGE   BARRIERS PRIMARY LEARNER NONE   CO-LEARNER CAREGIVER No   PRIMARY LANGUAGE ENGLISH    NEED -   LEARNER PREFERENCE PRIMARY DEMONSTRATION   LEARNING SPECIAL TOPICS -   ANSWERED BY patient   RELATIONSHIP SELF   ASSESSMENT COMMENT -       Abuse Screening:  Abuse Screening Questionnaire 10/28/2019   Do you ever feel afraid of your partner? N   Are you in a relationship with someone who physically or mentally threatens you? N   Is it safe for you to go home? Y             Coordination of Care:  1. Have you been to the ER, urgent care clinic since your last visit? Hospitalized since your last visit? yes    2. Have you seen or consulted any other health care providers outside of the [address] [place] [address] since your last visit? Include any pap smears or colon screening. yes      Advance Directive:  1. Do you have an advance directive in place? Patient Reply:yes      2. Per patient no changes to their ACP contact no.

## 2019-12-13 ENCOUNTER — OFFICE VISIT (OUTPATIENT)
Dept: INTERNAL MEDICINE CLINIC | Age: 34
End: 2019-12-13

## 2019-12-13 VITALS
HEIGHT: 64 IN | DIASTOLIC BLOOD PRESSURE: 83 MMHG | SYSTOLIC BLOOD PRESSURE: 110 MMHG | OXYGEN SATURATION: 99 % | HEART RATE: 99 BPM | RESPIRATION RATE: 16 BRPM | BODY MASS INDEX: 33.97 KG/M2 | WEIGHT: 199 LBS | TEMPERATURE: 98.3 F

## 2019-12-13 DIAGNOSIS — N92.0 MENORRHAGIA WITH REGULAR CYCLE: Primary | ICD-10-CM

## 2019-12-13 DIAGNOSIS — J98.8 RESPIRATORY TRACT INFECTION: ICD-10-CM

## 2019-12-13 DIAGNOSIS — J45.40 MODERATE PERSISTENT ASTHMA WITHOUT COMPLICATION: ICD-10-CM

## 2019-12-13 RX ORDER — ALBUTEROL SULFATE 0.83 MG/ML
2.5 SOLUTION RESPIRATORY (INHALATION)
Qty: 60 EACH | Refills: 5 | Status: SHIPPED | OUTPATIENT
Start: 2019-12-13 | End: 2020-01-13 | Stop reason: SDUPTHER

## 2019-12-13 NOTE — ED TRIAGE NOTES
Pt states she has been taking tamiflu & tylenol since Monday with no relief of symptoms and exacerbation of asthma with sob that began today. Pt self-administered albuterol neb tx approx. 30 min pta to ER.

## 2019-12-13 NOTE — ED PROVIDER NOTES
EMERGENCY DEPARTMENT HISTORY AND PHYSICAL EXAM    Date: 12/12/2019  Patient Name: Madelin Sosa    History of Presenting Illness     Chief Complaint   Patient presents with    Asthma    Flu     History Provided By: patient  Chief Complaint: flu-like symptoms: cough, congestion, fever, myalgias  Duration: 4 days  Timing:  constant  Modifying Factors: has been taking Tamiflu since Monday  Associated Symptoms: none     Additional History (Context): Alissa Torres is a 30 y.o. female with a history of asthma presents to the ED for evaluation of cough, congestion, myalgias, and fever persisting for the past 4 days. She states that she was seen an [facility] and was treated with Tamiflu however was not tested for the flu. She states that she is still experiencing the flu-like symptoms despite taking the Tamiflu as prescribed. No chest pain, SOB, abdominal pain, N/V/D. Pt states that she has been having to use her nebulizer treatments more frequently since onset of flu-like symptoms because she feels she is wheezing more frequently - her last neb treatment was 30 min PTA. PCP: Turner Lew MD    Current Outpatient Medications   Medication Sig Dispense Refill    doxycycline (VIBRA-TABS) 100 mg tablet Take 1 Tab by mouth two (2) times a day for 7 days. 14 Tab 0    amitriptyline (ELAVIL) 25 mg tablet Take 1 Tab by mouth nightly. 90 Tab 3    fluticasone propionate (FLONASE) 50 mcg/actuation nasal spray 1 puff to each nare after nasal saline rinse before bedtime. Indications: inflammation of the nose due to an allergy 1 Bottle 1    fluticasone propionate (FLOVENT HFA) 44 mcg/actuation inhaler Take 2 Puffs by inhalation two (2) times a day. 1 Inhaler 1    PROAIR HFA 90 mcg/actuation inhaler INHALE 2 PUFFS BY MOUTH EVERY 4 HOURS AS NEEDED FOR WHEEZING 1 Inhaler 0    alum-mag hydroxide-simeth (MYLANTA) 200-200-20 mg/5 mL susp Take 30 mL by mouth three (3) times daily (with meals).  1 Bottle 0    polyethylene glycol (MIRALAX) 17 gram/dose powder Take 17 g by mouth daily. 1 tablespoon with 8 oz of water daily (Patient taking differently: Take 17 g by mouth daily as needed. 1 tablespoon with 8 oz of water daily) 100 g 0    esomeprazole (NEXIUM) 40 mg capsule Take 1 Cap by mouth daily. 30 Cap 0    albuterol (PROVENTIL VENTOLIN) 2.5 mg /3 mL (0.083 %) nebulizer solution 3 mL by Nebulization route every four (4) hours as needed for Wheezing. 60 Each 0    EPINEPHrine (EPIPEN) 0.3 mg/0.3 mL injection 0.3 mL by IntraMUSCular route once as needed. 1 Syringe 0    inhalational spacing device 1 Each by Does Not Apply route as needed. 1 Each 0     Past History     Past Medical History:  Past Medical History:   Diagnosis Date    Asthma     Gastrointestinal disorder     ulcers    Ovarian cyst     Pneumonia     Ulcer      Past Surgical History:  Past Surgical History:   Procedure Laterality Date    HX ENDOSCOPY  10/2018    HX ORTHOPAEDIC      bilateral knee surgery     Family History:  Family History   Problem Relation Age of Onset    Diabetes Maternal Grandmother     Hypertension Father     Breast Cancer Paternal Grandmother     Cancer Neg Hx     Heart Disease Neg Hx     Stroke Neg Hx      Social History:  Social History     Tobacco Use    Smoking status: Never Smoker    Smokeless tobacco: Never Used   Substance Use Topics    Alcohol use: No     Frequency: Never    Drug use: No     Allergies: Allergies   Allergen Reactions    Nsaids (Non-Steroidal Anti-Inflammatory Drug) Nausea and Vomiting    Percocet [Oxycodone-Acetaminophen] Nausea and Vomiting    Tramadol Other (comments)     Abdominal pain     Review of Systems   Review of Systems   Constitutional: Positive for chills and fever. HENT: Positive for congestion and rhinorrhea. Respiratory: Positive for cough and wheezing. Cardiovascular: Negative for chest pain and palpitations.    Gastrointestinal: Negative for abdominal pain, nausea and vomiting. Genitourinary: Negative for dysuria and hematuria. Musculoskeletal: Positive for myalgias. Skin: Negative for rash and wound. Neurological: Negative for syncope and headaches. All other systems reviewed and are negative. All Other Systems Negative  Physical Exam     Vitals:    12/12/19 2056   BP: 132/85   Pulse: (!) 126   Resp: 20   Temp: 98.5 °F (36.9 °C)   SpO2: 98%   Weight: 93 kg (205 lb)   Height: 5' 4\" (1.626 m)     Physical Exam  Vitals signs and nursing note reviewed. Constitutional:       General: She is not in acute distress. Appearance: Normal appearance. She is not toxic-appearing. HENT:      Head: Normocephalic and atraumatic. Right Ear: External ear normal.      Nose: Congestion present. Mouth/Throat:      Mouth: Mucous membranes are moist.      Pharynx: Oropharynx is clear. No oropharyngeal exudate or posterior oropharyngeal erythema. Eyes:      General: No scleral icterus. Extraocular Movements: Extraocular movements intact. Conjunctiva/sclera: Conjunctivae normal.   Neck:      Musculoskeletal: Normal range of motion and neck supple. Cardiovascular:      Rate and Rhythm: Regular rhythm. Tachycardia present. Heart sounds: Normal heart sounds. No murmur. No friction rub. No gallop. Pulmonary:      Effort: Pulmonary effort is normal. No respiratory distress. Breath sounds: Normal breath sounds. No stridor. No wheezing, rhonchi or rales. Comments: Patient is speaking in complete sentences. No respiratory distress. Lymphadenopathy:      Cervical: No cervical adenopathy. Neurological:      Mental Status: She is alert. Diagnostic Study Results     Labs -   No results found for this or any previous visit (from the past 12 hour(s)).     Radiologic Studies -   XR CHEST PA LAT    (Results Pending)     CT Results  (Last 48 hours)    None        CXR Results  (Last 48 hours)    None        Medical Decision Making   I am the first provider for this patient. I reviewed the vital signs, available nursing notes, past medical history, past surgical history, family history and social history. Vital Signs-Reviewed the patient's vital signs. Records Reviewed: Nursing Notes and Old Medical Records     Procedures: None     Provider Notes (Medical Decision Making): Patient is a 29 y/o female presenting with cough, congestion, myalgias, and fever persisting for 4 days. Currently being treated for influenza despite not being tested. Pt states that her symptoms are not resolving. Vitals signs are stable here other than some slight tachycardia. Physical exam unremarkable other than some nasal congestion. No wheezing or respiratory distress present. Will order CXR to ensure that pt does not have PNA. 9:49 PM  CXR shows some slight patchy areas in right lung. This is likely reactive, however will give script for doxycycline to cover for PNA and will tell patient to fill this prescription if her symptoms do not improve in 2 days. Pt has been advised to follow-up with her pcp in 2 days if her symptoms to not improve, as well as to return to the ED upon any severe worsening of symptoms. Have also expressed that patient should take tylenol and motrin as necessary for fever/pain. Pt verbalized her agreement and understanding to this plan. MED RECONCILIATION:  No current facility-administered medications for this encounter. Current Outpatient Medications   Medication Sig    doxycycline (VIBRA-TABS) 100 mg tablet Take 1 Tab by mouth two (2) times a day for 7 days.  amitriptyline (ELAVIL) 25 mg tablet Take 1 Tab by mouth nightly.  fluticasone propionate (FLONASE) 50 mcg/actuation nasal spray 1 puff to each nare after nasal saline rinse before bedtime. Indications: inflammation of the nose due to an allergy    fluticasone propionate (FLOVENT HFA) 44 mcg/actuation inhaler Take 2 Puffs by inhalation two (2) times a day.    [address] [address] mcg/actuation inhaler INHALE 2 PUFFS BY MOUTH EVERY 4 HOURS AS NEEDED FOR WHEEZING    alum-mag hydroxide-simeth (MYLANTA) 200-200-20 mg/5 mL susp Take 30 mL by mouth three (3) times daily (with meals).  polyethylene glycol (MIRALAX) 17 gram/dose powder Take 17 g by mouth daily. 1 tablespoon with 8 oz of water daily (Patient taking differently: Take 17 g by mouth daily as needed. 1 tablespoon with 8 oz of water daily)    esomeprazole (NEXIUM) 40 mg capsule Take 1 Cap by mouth daily.  albuterol (PROVENTIL VENTOLIN) 2.5 mg /3 mL (0.083 %) nebulizer solution 3 mL by Nebulization route every four (4) hours as needed for Wheezing.  EPINEPHrine (EPIPEN) 0.3 mg/0.3 mL injection 0.3 mL by IntraMUSCular route once as needed.  inhalational spacing device 1 Each by Does Not Apply route as needed. Disposition:  Home     DISCHARGE NOTE:   Pt has been reexamined. Patient has no new complaints, changes, or physical findings. Care plan outlined and precautions discussed. Results of workup were reviewed with the patient. All medications were reviewed with the patient. All of pt's questions and concerns were addressed. Patient was instructed and agrees to follow up with PCP as well as to return to the ED upon further deterioration. Patient is ready to go home. Follow-up Information     Follow up With Specialties Details Why Contact Info    Danielle Whitlock MD Family Practice In 2 days If symptoms worsen [id number]  [phone] [address] [place] [address] EMERGENCY DEPT Emergency Medicine  As needed, If symptoms worsen [address] [place] [address]  [phone]          Current Discharge Medication List      START taking these medications    Details   doxycycline (VIBRA-TABS) 100 mg tablet Take 1 Tab by mouth two (2) times a day for 7 days. Qty: 14 Tab, Refills: 0             Diagnosis     Clinical Impression:   1. Viral illness    2.  Pneumonia of right lung due to infectious organism, unspecified part of lung

## 2019-12-13 NOTE — DISCHARGE INSTRUCTIONS
Patient Education        Pneumonia: Care Instructions  Your Care Instructions    Pneumonia is an infection of the lungs. Most cases are caused by infections from bacteria or viruses. Pneumonia may be mild or very severe. If it is caused by bacteria, you will be treated with antibiotics. It may take a few weeks to a few months to recover fully from pneumonia, depending on how sick you were and whether your overall health is good. Follow-up care is a key part of your treatment and safety. Be sure to make and go to all appointments, and call your doctor if you are having problems. It's also a good idea to know your test results and keep a list of the medicines you take. How can you care for yourself at home? · Take your antibiotics exactly as directed. Do not stop taking the medicine just because you are feeling better. You need to take the full course of antibiotics. · Take your medicines exactly as prescribed. Call your doctor if you think you are having a problem with your medicine. · Get plenty of rest and sleep. You may feel weak and tired for a while, but your energy level will improve with time. · To prevent dehydration, drink plenty of fluids, enough so that your urine is light yellow or clear like water. Choose water and other caffeine-free clear liquids until you feel better. If you have kidney, heart, or liver disease and have to limit fluids, talk with your doctor before you increase the amount of fluids you drink. · Take care of your cough so you can rest. A cough that brings up mucus from your lungs is common with pneumonia. It is one way your body gets rid of the infection. But if coughing keeps you from resting or causes severe fatigue and chest-wall pain, talk to your doctor. He or she may suggest that you take a medicine to reduce the cough. · Use a vaporizer or humidifier to add moisture to your bedroom. Follow the directions for cleaning the machine.   · Do not smoke or allow others to smoke around you. Smoke will make your cough last longer. If you need help quitting, talk to your doctor about stop-smoking programs and medicines. These can increase your chances of quitting for good. · Take an over-the-counter pain medicine, such as acetaminophen (Tylenol), ibuprofen (Advil, Motrin), or naproxen (Aleve). Read and follow all instructions on the label. · Do not take two or more pain medicines at the same time unless the doctor told you to. Many pain medicines have acetaminophen, which is Tylenol. Too much acetaminophen (Tylenol) can be harmful. · If you were given a spirometer to measure how well your lungs are working, use it as instructed. This can help your doctor tell how your recovery is going. · To prevent pneumonia in the future, talk to your doctor about getting a flu vaccine (once a year) and a pneumococcal vaccine (one time only for most people). When should you call for help? Call 911 anytime you think you may need emergency care. For example, call if:    · You have severe trouble breathing.    Call your doctor now or seek immediate medical care if:    · You cough up dark brown or bloody mucus (sputum).     · You have new or worse trouble breathing.     · You are dizzy or lightheaded, or you feel like you may faint.    Watch closely for changes in your health, and be sure to contact your doctor if:    · You have a new or higher fever.     · You are coughing more deeply or more often.     · You are not getting better after 2 days (48 hours).     · You do not get better as expected. Where can you learn more? Go to http://placido-willi.info/. Enter [id number] in the search box to learn more about \"Pneumonia: Care Instructions. \"  Current as of: June 9, 2019  Content Version: 12.2  © 9846-2700 "[employer]. Care instructions adapted under license by [employer] (which disclaims liability or warranty for this information).  If you have questions about a medical condition or this instruction, always ask your healthcare professional. [address] [address] any warranty or liability for your use of this information.

## 2019-12-13 NOTE — PROGRESS NOTES
INTERNISTS [facility]:  12/13/2019, MRN: [id number]      Fouzia Barger is a 30 y.o. female and presents to clinic for ED Follow-up (Seen at ER on 12-9-19 treated with tamiflu, went to ER again on 12-12-19 treated for pneumonia. )    Subjective: The pt is a 35yo female with h/o obesity GERD/PUD, asthma, and allergic rhinitis. 1. Respiratory Tract Infection: Duration of sx: 1 wk. Associated sx: cough. Sputum: yes. Sx prompted her to go to the ED at [facility]. She was given tamiflu for presumed influenza although per pt hx, she was never tested for this infection. Due to persistent sx despite taking Tamiflu, the patient went to our ED. At that time a chest x-ray was obtained. There is some concern for right-sided pneumonia. She was given a prescription for doxycycline. Since then, she is taking doxycycline and is on Day 1 of this rx; she stopped the tamiflu. She is not wheezing. Sx are improving. +Pleuritic CP. Sick contacts: yes. She is asking for a refill on her albuterol. Incidentally, she has a history of asthma. She used to take Symbicort. She is not taking it now though. She is using albuterol quite often but denies wheezing as mentioned above. She states that albuterol helps with her cough. 2. Heavy Menses: Associated with cramping and heavy bleeding. Menses are regular. Menses last 5 days. Patient Active Problem List    Diagnosis Date Noted    Prediabetes 10/28/2019    FLAHERTY (nonalcoholic steatohepatitis) 10/28/2019    Severe obesity (BMI 35.0-39.9) 09/06/2018    Chronic constipation 11/23/2016    Chronic tension-type headache, not intractable 11/23/2016    Asthma 05/02/2016    PUD (peptic ulcer disease) 05/02/2016       Current Outpatient Medications   Medication Sig Dispense Refill    albuterol (PROVENTIL VENTOLIN) 2.5 mg /3 mL (0.083 %) nebu 3 mL by Nebulization route every four (4) hours as needed for Wheezing.  60 Each 5    doxycycline (VIBRA-TABS) 100 mg tablet Take 1 Tab by mouth two (2) times a day for 7 days. 14 Tab 0    fluticasone propionate (FLONASE) 50 mcg/actuation nasal spray 1 puff to each nare after nasal saline rinse before bedtime. Indications: inflammation of the nose due to an allergy 1 Bottle 1    fluticasone propionate (FLOVENT HFA) 44 mcg/actuation inhaler Take 2 Puffs by inhalation two (2) times a day. 1 Inhaler 1    PROAIR HFA 90 mcg/actuation inhaler INHALE 2 PUFFS BY MOUTH EVERY 4 HOURS AS NEEDED FOR WHEEZING 1 Inhaler 0    inhalational spacing device 1 Each by Does Not Apply route as needed. 1 Each 0    amitriptyline (ELAVIL) 25 mg tablet Take 1 Tab by mouth nightly. 90 Tab 3    alum-mag hydroxide-simeth (MYLANTA) 200-200-20 mg/5 mL susp Take 30 mL by mouth three (3) times daily (with meals). 1 Bottle 0    polyethylene glycol (MIRALAX) 17 gram/dose powder Take 17 g by mouth daily. 1 tablespoon with 8 oz of water daily (Patient taking differently: Take 17 g by mouth daily as needed. 1 tablespoon with 8 oz of water daily) 100 g 0    esomeprazole (NEXIUM) 40 mg capsule Take 1 Cap by mouth daily. 30 Cap 0    EPINEPHrine (EPIPEN) 0.3 mg/0.3 mL injection 0.3 mL by IntraMUSCular route once as needed.  1 Syringe 0       Allergies   Allergen Reactions    Nsaids (Non-Steroidal Anti-Inflammatory Drug) Nausea and Vomiting    Percocet [Oxycodone-Acetaminophen] Nausea and Vomiting    Tramadol Other (comments)     Abdominal pain       Past Medical History:   Diagnosis Date    Asthma     Gastrointestinal disorder     ulcers    Ovarian cyst     Pneumonia     Ulcer        Past Surgical History:   Procedure Laterality Date    HX ENDOSCOPY  10/2018    HX ORTHOPAEDIC      bilateral knee surgery       Family History   Problem Relation Age of Onset    Diabetes Maternal Grandmother     Hypertension Father     Breast Cancer Paternal Grandmother     Cancer Neg Hx     Heart Disease Neg Hx     Stroke Neg Hx        Social History     Tobacco Use    Smoking status: Never Smoker    Smokeless tobacco: Never Used   Substance Use Topics    Alcohol use: No     Frequency: Never       ROS   Review of Systems   Constitutional: Positive for malaise/fatigue. Negative for chills and fever. HENT: Negative for ear pain and sore throat. Eyes: Negative for blurred vision and pain (none today). Respiratory: Positive for cough, sputum production and shortness of breath. Negative for wheezing. Cardiovascular: Positive for chest pain (pleuritic CP). Gastrointestinal: Negative for abdominal pain, blood in stool and melena. Genitourinary: Negative for dysuria and hematuria. Musculoskeletal: Positive for myalgias. Negative for joint pain. Skin: Negative for rash. Neurological: Negative for tingling, focal weakness and headaches (resolved by today's apt). Endo/Heme/Allergies: Does not bruise/bleed easily. Psychiatric/Behavioral: Negative for substance abuse. Objective     Vitals:    12/13/19 0827   BP: 110/83   Pulse: 99   Resp: 16   Temp: 98.3 °F (36.8 °C)   TempSrc: Oral   SpO2: 99%   Weight: 199 lb (90.3 kg)   Height: 5' 4\" (1.626 m)   PainSc:   3   PainLoc: Back   LMP: 12/10/2019       Physical Exam  Vitals signs and nursing note reviewed. HENT:      Head: Normocephalic and atraumatic. Right Ear: Tympanic membrane and external ear normal.      Left Ear: Tympanic membrane and external ear normal.      Nose: Nose normal.      Mouth/Throat:      Pharynx: No oropharyngeal exudate or posterior oropharyngeal erythema. Eyes:      General: No scleral icterus. Right eye: No discharge. Left eye: No discharge. Conjunctiva/sclera: Conjunctivae normal.   Neck:      Musculoskeletal: Neck supple. Cardiovascular:      Rate and Rhythm: Normal rate and regular rhythm. Heart sounds: Normal heart sounds. No murmur. No friction rub. No gallop. Pulmonary:      Effort: Pulmonary effort is normal. No respiratory distress.       Breath sounds: Normal breath sounds. No wheezing or rales. Chest:      Chest wall: No tenderness. Abdominal:      General: Bowel sounds are normal. There is no distension. Palpations: Abdomen is soft. There is no mass. Tenderness: There is tenderness (LLQ is TTP but she attributes her pain to her menses she is on right now). There is no guarding or rebound. Musculoskeletal:         General: No tenderness (BUE). Lymphadenopathy:      Cervical: No cervical adenopathy. Skin:     General: Skin is warm and dry. Findings: No erythema. Neurological:      Mental Status: She is alert and oriented to person, place, and time. Motor: No abnormal muscle tone. Gait: Gait is intact. Gait normal.   Psychiatric:         Mood and Affect: Mood and affect normal.         LABS   Data Review:   Lab Results   Component Value Date/Time    WBC 4.6 11/14/2018 02:56 PM    HGB 13.8 11/14/2018 02:56 PM    HCT 42.4 11/14/2018 02:56 PM    PLATELET 631 56/57/6376 02:56 PM    MCV 87.1 11/14/2018 02:56 PM       Lab Results   Component Value Date/Time    Sodium 139 11/14/2018 02:56 PM    Potassium 4.2 11/14/2018 02:56 PM    Chloride 105 11/14/2018 02:56 PM    CO2 26 11/14/2018 02:56 PM    Anion gap 8 11/14/2018 02:56 PM    Glucose 99 11/14/2018 02:56 PM    BUN 8 11/14/2018 02:56 PM    Creatinine 0.81 11/14/2018 02:56 PM    BUN/Creatinine ratio 10 (L) 11/14/2018 02:56 PM    GFR est AA >60 11/14/2018 02:56 PM    GFR est non-AA >60 11/14/2018 02:56 PM    Calcium 9.0 11/14/2018 02:56 PM       No results found for: CHOL, CHOLPOCT, CHOLX, CHLST, CHOLV, HDL, HDLPOC, HDLP, LDL, LDLCPOC, LDLC, DLDLP, VLDLC, VLDL, TGLX, TRIGL, TRIGP, TGLPOCT, CHHD, CHHDX    Lab Results   Component Value Date/Time    Hemoglobin A1c 5.8 (H) 11/14/2018 02:56 PM    Hemoglobin A1c (POC) 5.5 07/11/2019 10:26 AM       Assessment/Plan:   1. Respiratory Tract Infection: S/p tamiflu and taking doxycycline. +H/o asthma.  -Supportive care.   Rest.  Hydration with water.  -Return to clinic if symptoms worsen. - Albuterol refilled. I instructed her to notify me if her symptoms persist past 6 weeks or if they worsen. If she continues to have cough symptoms, I would recommend that we put her on a controller inhaler for her asthma. 2. Heavy Menses:   -Referral placed to GYN. We discussed briefly hormone therapy. Will defer ultimately to her GYN team.      There are no preventive care reminders to display for this patient. Lab review: labs are reviewed in the EHR    I have discussed the diagnosis with the patient and the intended plan as seen in the above orders. The patient has received an after-visit summary and questions were answered concerning future plans. I have discussed medication side effects and warnings with the patient as well. I have reviewed the plan of care with the patient, accepted their input and they are in agreement with the treatment goals. All questions were answered. The patient understands the plan of care. Handouts provided today with above information. Pt instructed if symptoms worsen to call the office or report to the ED for continued care. Greater than 50% of the visit time was spent in counseling and/or coordination of care. Voice recognition was used to generate this report, which may have resulted in some phonetic based errors in grammar and contents. Even though attempts were made to correct all the mistakes, some may have been missed, and remained in the body of the document.           William Jack MD

## 2019-12-13 NOTE — PATIENT INSTRUCTIONS
Cough: Care Instructions  Your Care Instructions    A cough is your body's response to something that bothers your throat or airways. Many things can cause a cough. You might cough because of a cold or the flu, bronchitis, or asthma. Smoking, postnasal drip, allergies, and stomach acid that backs up into your throat also can cause coughs. A cough is a symptom, not a disease. Most coughs stop when the cause, such as a cold, goes away. You can take a few steps at home to cough less and feel better. Follow-up care is a key part of your treatment and safety. Be sure to make and go to all appointments, and call your doctor if you are having problems. It's also a good idea to know your test results and keep a list of the medicines you take. How can you care for yourself at home? · Drink lots of water and other fluids. This helps thin the mucus and soothes a dry or sore throat. Honey or lemon juice in hot water or tea may ease a dry cough. · Take cough medicine as directed by your doctor. · Prop up your head on pillows to help you breathe and ease a dry cough. · Try cough drops to soothe a dry or sore throat. Cough drops don't stop a cough. Medicine-flavored cough drops are no better than candy-flavored drops or hard candy. · Do not smoke. Avoid secondhand smoke. If you need help quitting, talk to your doctor about stop-smoking programs and medicines. These can increase your chances of quitting for good. When should you call for help? Call 911 anytime you think you may need emergency care.  For example, call if:    · You have severe trouble breathing.    Call your doctor now or seek immediate medical care if:    · You cough up blood.     · You have new or worse trouble breathing.     · You have a new or higher fever.     · You have a new rash.    Watch closely for changes in your health, and be sure to contact your doctor if:    · You cough more deeply or more often, especially if you notice more mucus or a change in the color of your mucus.     · You have new symptoms, such as a sore throat, an earache, or sinus pain.     · You do not get better as expected. Where can you learn more? Go to http://placido-willi.info/. Enter D279 in the search box to learn more about \"Cough: Care Instructions. \"  Current as of: June 9, 2019  Content Version: 12.2  © 8580-1197 [employer]. Care instructions adapted under license by [employer] (which disclaims liability or warranty for this information). If you have questions about a medical condition or this instruction, always ask your healthcare professional. [address] any warranty or liability for your use of this information.

## 2020-01-13 DIAGNOSIS — J98.8 RESPIRATORY TRACT INFECTION: ICD-10-CM

## 2020-01-13 RX ORDER — ALBUTEROL SULFATE 0.83 MG/ML
2.5 SOLUTION RESPIRATORY (INHALATION)
Qty: 60 EACH | Refills: 5 | Status: SHIPPED | OUTPATIENT
Start: 2020-01-13 | End: 2021-02-16 | Stop reason: SDUPTHER

## 2020-01-13 RX ORDER — ALBUTEROL SULFATE 90 UG/1
2 AEROSOL, METERED RESPIRATORY (INHALATION)
Qty: 1 INHALER | Refills: 3 | Status: SHIPPED | OUTPATIENT
Start: 2020-01-13 | End: 2021-02-16 | Stop reason: SDUPTHER

## 2020-01-13 NOTE — TELEPHONE ENCOUNTER
Last Visit: 12/13/19 with MD Ralph Fung  Next Appointment: none  Previous Refill Encounter(s): 12/31/19 Albuterol neb #60 with 5 refills, 11/12/18 Proair #1    Requested Prescriptions     Pending Prescriptions Disp Refills    albuterol (PROVENTIL VENTOLIN) 2.5 mg /3 mL (0.083 %) nebu 60 Each 5     Sig: 3 mL by Nebulization route every four (4) hours as needed for Wheezing.  albuterol (PROAIR HFA) 90 mcg/actuation inhaler 1 Inhaler 3     Sig: Take 2 Puffs by inhalation every four (4) hours as needed for Wheezing.

## 2020-02-10 ENCOUNTER — TELEPHONE (OUTPATIENT)
Dept: INTERNAL MEDICINE CLINIC | Age: 35
End: 2020-02-10

## 2020-02-10 NOTE — TELEPHONE ENCOUNTER
Called and spoke with patient. She was advised to go to urgent care, as we do not have any openings. Patient verbalizes understanding.

## 2020-02-10 NOTE — TELEPHONE ENCOUNTER
Pt calling stating she has bodyaches,chills, congestion , she is asthmatic she said she has already done two breathing treatments and still wheezing can she be seen?

## 2021-02-16 ENCOUNTER — VIRTUAL VISIT (OUTPATIENT)
Dept: INTERNAL MEDICINE CLINIC | Age: 36
End: 2021-02-16
Payer: COMMERCIAL

## 2021-02-16 DIAGNOSIS — R73.03 PREDIABETES: ICD-10-CM

## 2021-02-16 DIAGNOSIS — R51.9 HEADACHE, CHRONIC DAILY: Primary | ICD-10-CM

## 2021-02-16 DIAGNOSIS — K75.81 NASH (NONALCOHOLIC STEATOHEPATITIS): ICD-10-CM

## 2021-02-16 DIAGNOSIS — J45.40 MODERATE PERSISTENT ASTHMA WITHOUT COMPLICATION: ICD-10-CM

## 2021-02-16 PROCEDURE — 99214 OFFICE O/P EST MOD 30 MIN: CPT | Performed by: INTERNAL MEDICINE

## 2021-02-16 RX ORDER — AMITRIPTYLINE HYDROCHLORIDE 25 MG/1
25 TABLET, FILM COATED ORAL
Qty: 90 TAB | Refills: 3 | Status: SHIPPED | OUTPATIENT
Start: 2021-02-16

## 2021-02-16 RX ORDER — ALBUTEROL SULFATE 0.83 MG/ML
2.5 SOLUTION RESPIRATORY (INHALATION)
Qty: 60 EACH | Refills: 5 | Status: SHIPPED | OUTPATIENT
Start: 2021-02-16 | End: 2021-03-18

## 2021-02-16 NOTE — PROGRESS NOTES
Nai Carcamo is a 37 y.o. female who was seen by synchronous (real-time) audio-video technology on 2/16/2021. Assessment & Plan:   1. Chronic headaches: Responded well in the past and Elavil. There is likely a component that is due to her chronic allergic rhinitis. Refilling her Elavil. I instructed her to notify me if her symptoms worsen. At that time, I would consider adding Flonase and/or Singulair. If her symptoms do not respond to Elavil, I would also consider other medications for chronic tension/migraine headaches. Due to her weight loss, I have low suspicion of underlying SHAIR. 2.  Prediabetes: Losing weight. I encouraged her to continue her weight loss journey. Ordering an A1c and CMP for her to get her convenience. 3.  Health Maintenance:  I encouraged her to get her Pap smear and vaccinations. She stated that she is not 1 to visit the doctor frequently. She will let me know when she is interested in pursuing these studies. 4.  Asthma: Relatively stable. Refilling her albuterol nebulizer Rx. I instructed her to notify me if her symptoms worsen and she is requiring use of her albuterol on a weekly basis. Lab review: labs are reviewed in the EHR and ordered as mentioned above. I have discussed the diagnosis with the patient and the intended plan as seen in the above orders. I have discussed medication side effects and warnings with the patient as well. I have reviewed the plan of care with the patient, accepted their input and they are in agreement with the treatment goals. All questions were answered. The patient understands the plan of care. Pt instructed if symptoms worsen to call the office or report to the ED for continued care. Greater than 50% of the visit time was spent in counseling and/or coordination of care. Voice recognition was used to generate this report, which may have resulted in some phonetic based errors in grammar and contents. Even though attempts were made to correct all the mistakes, some may have been missed, and remained in the body of the document. Subjective:   Matty Sosa was seen for   Chief Complaint   Patient presents with    Anxiety     The pt is a 34yo female with h/o obesity GERD/PUD, prediabetes, asthma, and allergic rhinitis. 1. Asthma:  Using: albuterol rarely - with exercise or if she gets a \"cold. \" She has been using her albuterol more frequently recently - see #3. 2. Prediabetes: She lost weight since her last apt. Overdue for labs. 3. Chronic HAs: She has a h/o chronic HAs, treated in the past (well) with elavil. She recently started getting HAs again. She thinks its because of where she works - because of mold at her job's location. Upon arriving at work, she gets a \"consistent HA\" that lasts for days and congestion. No stress. +H/o allergic rhinitis but is not taking anything for this. Her HAs are occurring more frequently and lasting longer. Similar sx responded well to elavil. She ran out of it 1.5 wks ago. She admits that she was using it as needed. Prior to Admission medications    Medication Sig Start Date End Date Taking? Authorizing Provider   albuterol (ProAir HFA) 90 mcg/actuation inhaler Take 2 Puffs by inhalation every four (4) hours as needed for Wheezing. 10/5/20   Catie Jonas PA-C   butalbital-acetaminophen-caff (Fioricet) -40 mg per capsule Take 1 Cap by mouth every four (4) hours as needed for Headache. 10/5/20   Catie Jonas PA-C   albuterol (PROVENTIL VENTOLIN) 2.5 mg /3 mL (0.083 %) nebu 3 mL by Nebulization route every four (4) hours as needed for Wheezing. 1/13/20   Trenton Greenwood MD   albuterol [facility] HFA) 90 mcg/actuation inhaler Take 2 Puffs by inhalation every four (4) hours as needed for Wheezing. 1/13/20   Trenton Greenwood MD   amitriptyline (ELAVIL) 25 mg tablet Take 1 Tab by mouth nightly.  7/11/19   Dianne Hughes NP fluticasone propionate (FLONASE) 50 mcg/actuation nasal spray 1 puff to each nare after nasal saline rinse before bedtime. Indications: inflammation of the nose due to an allergy 7/11/19   Reny Sharp NP   fluticasone propionate (FLOVENT HFA) 44 mcg/actuation inhaler Take 2 Puffs by inhalation two (2) times a day. 7/11/19   Reny Sharp NP   alum-mag hydroxide-simeth (MYLANTA) 200-200-20 mg/5 mL susp Take 30 mL by mouth three (3) times daily (with meals). 10/4/18   ENID Menjivar   polyethylene glycol [facility]) 17 gram/dose powder Take 17 g by mouth daily. 1 tablespoon with 8 oz of water daily  Patient taking differently: Take 17 g by mouth daily as needed. 1 tablespoon with 8 oz of water daily 10/4/18   ENID Menjivar   esomeprazole (NEXIUM) 40 mg capsule Take 1 Cap by mouth daily. 9/6/18   Kanwal Lancaster MD   EPINEPHrine (EPIPEN) 0.3 mg/0.3 mL injection 0.3 mL by IntraMUSCular route once as needed. 7/11/17   Derrick Tineo MD   inhalational spacing device 1 Each by Does Not Apply route as needed.  10/4/16   Ashley Maier MD     Allergies   Allergen Reactions    Nsaids (Non-Steroidal Anti-Inflammatory Drug) Nausea and Vomiting    Percocet [Oxycodone-Acetaminophen] Nausea and Vomiting    Tramadol Other (comments)     Abdominal pain     Past Medical History:   Diagnosis Date    Asthma     Gastrointestinal disorder     ulcers    Ovarian cyst     Pneumonia     Ulcer      Past Surgical History:   Procedure Laterality Date    HX ENDOSCOPY  10/2018    HX ORTHOPAEDIC      bilateral knee surgery     Family History   Problem Relation Age of Onset    Diabetes Maternal Grandmother     Hypertension Father     Breast Cancer Paternal Grandmother     Cancer Neg Hx     Heart Disease Neg Hx     Stroke Neg Hx      Social History     Socioeconomic History    Marital status: SINGLE     Spouse name: Not on file    Number of children: Not on file    Years of education: Not on file    Highest education level: Not on file   Tobacco Use    Smoking status: Never Smoker    Smokeless tobacco: Never Used   Substance and Sexual Activity    Alcohol use: No     Frequency: Never    Drug use: No    Sexual activity: Yes     Partners: Female       ROS:  Gen: No fever/chills  HEENT: See HPI  CV: No CP  Resp: No cough/SOB today  GI: No abdominal pain  : No hematuria/dysuria  Derm: No rash  Neuro: No new paresthesias/weakness  Musc: No new myalgias/jt pain  Psych: No depression sx were discussed       Objective:     General: alert, cooperative, no distress   Mental  status: mental status: alert, oriented to person, place, and time, normal mood, behavior, speech, dress, motor activity, and thought processes   Resp: resp: normal effort and no respiratory distress   Neuro: neuro: no gross deficits   Skin: skin: no discoloration or lesions of concern on visible areas     LABS:  Lab Results   Component Value Date/Time    Sodium 139 11/14/2018 02:56 PM    Potassium 4.2 11/14/2018 02:56 PM    Chloride 105 11/14/2018 02:56 PM    CO2 26 11/14/2018 02:56 PM    Anion gap 8 11/14/2018 02:56 PM    Glucose 99 11/14/2018 02:56 PM    BUN 8 11/14/2018 02:56 PM    Creatinine 0.81 11/14/2018 02:56 PM    BUN/Creatinine ratio 10 (L) 11/14/2018 02:56 PM    GFR est AA >60 11/14/2018 02:56 PM    GFR est non-AA >60 11/14/2018 02:56 PM    Calcium 9.0 11/14/2018 02:56 PM       No results found for: CHOL, CHOLPOCT, CHOLX, CHLST, CHOLV, HDL, HDLPOC, HDLP, LDL, LDLCPOC, LDLC, DLDLP, VLDLC, VLDL, TGLX, TRIGL, TRIGP, TGLPOCT, CHHD, CHHDX    Lab Results   Component Value Date/Time    WBC 4.6 11/14/2018 02:56 PM    HGB 13.8 11/14/2018 02:56 PM    HCT 42.4 11/14/2018 02:56 PM    PLATELET 005 55/24/9594 02:56 PM    MCV 87.1 11/14/2018 02:56 PM       Lab Results   Component Value Date/Time    Hemoglobin A1c 5.8 (H) 11/14/2018 02:56 PM    Hemoglobin A1c (POC) 5.5 07/11/2019 10:26 AM       Lab Results   Component Value Date/Time    TSH 1.30 11/14/2018 02:56 PM           Due to this being a TeleHealth  evaluation, many elements of the physical examination are unable to be assessed. The pt was seen by synchronous (real-time) audio-video technology, and/or her healthcare decision maker, is aware that this patient-initiated, Telehealth encounter is a billable service, with coverage as determined by her insurance carrier. She is aware that she may receive a bill and has provided verbal consent to proceed: Yes. The pt is being evaluated by a video visit encounter for concerns as above. A caregiver was present when appropriate. Due to this being a TeleHealth encounter (During VZILG-26 public health emergency), evaluation of the following organ systems was limited: Vitals/Constitutional/EENT/Resp/CV/GI//MS/Neuro/Skin/Heme-Lymph-Imm. Pursuant to the emergency declaration under the [address] [place] [address], [facility] waiver authority and the [employer] Act, this Virtual  Visit was conducted, with patient's (and/or legal guardian's) consent, to reduce the patient's risk of exposure to COVID-19 and provide necessary medical care. Services were provided through a video synchronous discussion virtually to substitute for in-person clinic visit. Patient and provider were located at their individual homes. We discussed the expected course, resolution and complications of the diagnosis(es) in detail. Medication risks, benefits, costs, interactions, and alternatives were discussed as indicated. I advised her to contact the office if her condition worsens, changes or fails to improve as anticipated. She expressed understanding with the diagnosis(es) and plan.      Leo Cha MD

## 2021-02-17 ENCOUNTER — TELEPHONE (OUTPATIENT)
Dept: INTERNAL MEDICINE CLINIC | Age: 36
End: 2021-02-17

## 2021-02-17 NOTE — TELEPHONE ENCOUNTER
----- Message from Amanda Sin MD sent at 2/16/2021 10:13 AM EST -----  Regarding: F/u apts needed  Please schedule this patient for labs this month. Please schedule her for a follow-up appointment in a year with me in the office.     Thanks,  Dr. Roberto Forman  [facility], [address] [place] [address], [address] [place] [address] Str.  Phone: [phone]  Fax: [phone]

## 2022-04-07 ENCOUNTER — PATIENT MESSAGE (OUTPATIENT)
Dept: INTERNAL MEDICINE CLINIC | Age: 37
End: 2022-04-07

## 2022-12-15 ENCOUNTER — TELEPHONE (OUTPATIENT)
Dept: INTERNAL MEDICINE CLINIC | Age: 37
End: 2022-12-15

## 2022-12-15 NOTE — TELEPHONE ENCOUNTER
[facility]urse triage call [facility]---    Pt c/o extreme chest tightness, congestion, bad headache, little cough, that all started this morning when she got up. No fever. Said she was fine last night. She has h/o asthma but she said it hasn't been bothering her lately and she doesn't have any nebulizer nor inhaler at home. She called pharmacy and said rx's were .     Please advise her at [phone]